# Patient Record
Sex: FEMALE | Race: BLACK OR AFRICAN AMERICAN | NOT HISPANIC OR LATINO | Employment: OTHER | URBAN - METROPOLITAN AREA
[De-identification: names, ages, dates, MRNs, and addresses within clinical notes are randomized per-mention and may not be internally consistent; named-entity substitution may affect disease eponyms.]

---

## 2020-03-01 ENCOUNTER — HOSPITAL ENCOUNTER (EMERGENCY)
Facility: HOSPITAL | Age: 65
Discharge: HOME | End: 2020-03-01
Attending: EMERGENCY MEDICINE
Payer: COMMERCIAL

## 2020-03-01 VITALS
HEIGHT: 62 IN | HEART RATE: 67 BPM | OXYGEN SATURATION: 94 % | TEMPERATURE: 98 F | BODY MASS INDEX: 28.52 KG/M2 | DIASTOLIC BLOOD PRESSURE: 79 MMHG | SYSTOLIC BLOOD PRESSURE: 163 MMHG | RESPIRATION RATE: 24 BRPM | WEIGHT: 155 LBS

## 2020-03-01 DIAGNOSIS — R07.9 CHEST PAIN: ICD-10-CM

## 2020-03-01 DIAGNOSIS — M54.9 BACK PAIN, UNSPECIFIED BACK LOCATION, UNSPECIFIED BACK PAIN LATERALITY, UNSPECIFIED CHRONICITY: Primary | ICD-10-CM

## 2020-03-01 DIAGNOSIS — I10 HYPERTENSION, UNSPECIFIED TYPE: ICD-10-CM

## 2020-03-01 LAB
ALBUMIN SERPL BCP-MCNC: 3.7 G/DL (ref 3.5–5.2)
ALP SERPL-CCNC: 94 U/L (ref 55–135)
ALT SERPL W/O P-5'-P-CCNC: 10 U/L (ref 10–44)
ANION GAP SERPL CALC-SCNC: 12 MMOL/L (ref 8–16)
AST SERPL-CCNC: 11 U/L (ref 10–40)
BASOPHILS # BLD AUTO: 0.03 K/UL (ref 0–0.2)
BASOPHILS NFR BLD: 0.4 % (ref 0–1.9)
BILIRUB SERPL-MCNC: 0.8 MG/DL (ref 0.1–1)
BNP SERPL-MCNC: 33 PG/ML (ref 0–99)
BUN SERPL-MCNC: 8 MG/DL (ref 8–23)
CALCIUM SERPL-MCNC: 10.4 MG/DL (ref 8.7–10.5)
CHLORIDE SERPL-SCNC: 103 MMOL/L (ref 95–110)
CO2 SERPL-SCNC: 23 MMOL/L (ref 23–29)
CREAT SERPL-MCNC: 0.7 MG/DL (ref 0.5–1.4)
DIFFERENTIAL METHOD: ABNORMAL
EOSINOPHIL # BLD AUTO: 0.1 K/UL (ref 0–0.5)
EOSINOPHIL NFR BLD: 0.8 % (ref 0–8)
ERYTHROCYTE [DISTWIDTH] IN BLOOD BY AUTOMATED COUNT: 14.4 % (ref 11.5–14.5)
EST. GFR  (AFRICAN AMERICAN): >60 ML/MIN/1.73 M^2
EST. GFR  (NON AFRICAN AMERICAN): >60 ML/MIN/1.73 M^2
GLUCOSE SERPL-MCNC: 101 MG/DL (ref 70–110)
HCT VFR BLD AUTO: 40.6 % (ref 37–48.5)
HGB BLD-MCNC: 12.9 G/DL (ref 12–16)
IMM GRANULOCYTES # BLD AUTO: 0.02 K/UL (ref 0–0.04)
IMM GRANULOCYTES NFR BLD AUTO: 0.3 % (ref 0–0.5)
LYMPHOCYTES # BLD AUTO: 1.9 K/UL (ref 1–4.8)
LYMPHOCYTES NFR BLD: 23.8 % (ref 18–48)
MCH RBC QN AUTO: 27 PG (ref 27–31)
MCHC RBC AUTO-ENTMCNC: 31.8 G/DL (ref 32–36)
MCV RBC AUTO: 85 FL (ref 82–98)
MONOCYTES # BLD AUTO: 0.6 K/UL (ref 0.3–1)
MONOCYTES NFR BLD: 7.4 % (ref 4–15)
NEUTROPHILS # BLD AUTO: 5.3 K/UL (ref 1.8–7.7)
NEUTROPHILS NFR BLD: 67.3 % (ref 38–73)
NRBC BLD-RTO: 0 /100 WBC
PLATELET # BLD AUTO: 236 K/UL (ref 150–350)
PMV BLD AUTO: 10.4 FL (ref 9.2–12.9)
POTASSIUM SERPL-SCNC: 3.9 MMOL/L (ref 3.5–5.1)
PROT SERPL-MCNC: 8.5 G/DL (ref 6–8.4)
RBC # BLD AUTO: 4.77 M/UL (ref 4–5.4)
SODIUM SERPL-SCNC: 138 MMOL/L (ref 136–145)
TROPONIN I SERPL DL<=0.01 NG/ML-MCNC: <0.006 NG/ML (ref 0–0.03)
TROPONIN I SERPL DL<=0.01 NG/ML-MCNC: <0.006 NG/ML (ref 0–0.03)
WBC # BLD AUTO: 7.82 K/UL (ref 3.9–12.7)

## 2020-03-01 PROCEDURE — 85025 COMPLETE CBC W/AUTO DIFF WBC: CPT

## 2020-03-01 PROCEDURE — 93010 EKG 12-LEAD: ICD-10-PCS | Mod: ,,, | Performed by: INTERNAL MEDICINE

## 2020-03-01 PROCEDURE — 99284 EMERGENCY DEPT VISIT MOD MDM: CPT | Mod: 25

## 2020-03-01 PROCEDURE — 63600175 PHARM REV CODE 636 W HCPCS: Performed by: EMERGENCY MEDICINE

## 2020-03-01 PROCEDURE — 96375 TX/PRO/DX INJ NEW DRUG ADDON: CPT

## 2020-03-01 PROCEDURE — 96374 THER/PROPH/DIAG INJ IV PUSH: CPT

## 2020-03-01 PROCEDURE — 93010 ELECTROCARDIOGRAM REPORT: CPT | Mod: ,,, | Performed by: INTERNAL MEDICINE

## 2020-03-01 PROCEDURE — 84484 ASSAY OF TROPONIN QUANT: CPT

## 2020-03-01 PROCEDURE — 25000003 PHARM REV CODE 250: Performed by: EMERGENCY MEDICINE

## 2020-03-01 PROCEDURE — 80053 COMPREHEN METABOLIC PANEL: CPT

## 2020-03-01 PROCEDURE — 93005 ELECTROCARDIOGRAM TRACING: CPT

## 2020-03-01 PROCEDURE — 83880 ASSAY OF NATRIURETIC PEPTIDE: CPT

## 2020-03-01 RX ORDER — HYDROMORPHONE HYDROCHLORIDE 2 MG/ML
0.5 INJECTION, SOLUTION INTRAMUSCULAR; INTRAVENOUS; SUBCUTANEOUS
Status: COMPLETED | OUTPATIENT
Start: 2020-03-01 | End: 2020-03-01

## 2020-03-01 RX ORDER — ASPIRIN 325 MG
325 TABLET ORAL
Status: COMPLETED | OUTPATIENT
Start: 2020-03-01 | End: 2020-03-01

## 2020-03-01 RX ORDER — LISINOPRIL 10 MG/1
10 TABLET ORAL DAILY
COMMUNITY
End: 2022-06-27 | Stop reason: DRUGHIGH

## 2020-03-01 RX ORDER — NAPROXEN 500 MG/1
500 TABLET ORAL 2 TIMES DAILY WITH MEALS
Qty: 20 TABLET | Refills: 0 | Status: SHIPPED | OUTPATIENT
Start: 2020-03-01 | End: 2020-03-01 | Stop reason: SDUPTHER

## 2020-03-01 RX ORDER — METHOCARBAMOL 500 MG/1
1000 TABLET, FILM COATED ORAL 3 TIMES DAILY
Qty: 30 TABLET | Refills: 0 | Status: SHIPPED | OUTPATIENT
Start: 2020-03-01 | End: 2020-03-06

## 2020-03-01 RX ORDER — LABETALOL HYDROCHLORIDE 5 MG/ML
10 INJECTION, SOLUTION INTRAVENOUS
Status: COMPLETED | OUTPATIENT
Start: 2020-03-01 | End: 2020-03-01

## 2020-03-01 RX ORDER — METHOCARBAMOL 500 MG/1
1000 TABLET, FILM COATED ORAL 3 TIMES DAILY
Qty: 30 TABLET | Refills: 0 | Status: SHIPPED | OUTPATIENT
Start: 2020-03-01 | End: 2020-03-01 | Stop reason: SDUPTHER

## 2020-03-01 RX ORDER — ATORVASTATIN CALCIUM 10 MG/1
10 TABLET, FILM COATED ORAL DAILY
COMMUNITY

## 2020-03-01 RX ORDER — NAPROXEN 500 MG/1
500 TABLET ORAL 2 TIMES DAILY WITH MEALS
Qty: 20 TABLET | Refills: 0 | Status: SHIPPED | OUTPATIENT
Start: 2020-03-01 | End: 2022-06-27

## 2020-03-01 RX ADMIN — ASPIRIN 325 MG ORAL TABLET 325 MG: 325 PILL ORAL at 10:03

## 2020-03-01 RX ADMIN — HYDROMORPHONE HYDROCHLORIDE 0.5 MG: 2 INJECTION, SOLUTION INTRAMUSCULAR; INTRAVENOUS; SUBCUTANEOUS at 10:03

## 2020-03-01 RX ADMIN — LABETALOL HYDROCHLORIDE 10 MG: 5 INJECTION INTRAVENOUS at 12:03

## 2020-03-01 NOTE — ED TRIAGE NOTES
Pt arrived to the ED via POV from home with c/o right shoulder pain. Reports shoulder painthat radiates to rt side of neck started on this past Friday. Reports some heavy lifting(boxes in the attic) and pain started shortly afterwards. Rates pain 9/10 on pain scale.

## 2020-03-01 NOTE — ED PROVIDER NOTES
Encounter Date: 3/1/2020    SCRIBE #1 NOTE: I, Courtney Mace, am scribing for, and in the presence of,  Viridiana Osei MD. I have scribed the following portions of the note - Other sections scribed: HPI/ROS/PE.       History     Chief Complaint   Patient presents with    Neck Pain     Neck spasms that began Friday night.  Denies known injury.  /93, 201/93 at triage.  Pt took BP meds at 7:30 am.    Hypertension     This 64 y.o. female with a medical history of HTN and high cholesterol presents to the ED for an emergent evaluation of neck pain. Pt reports R-sided neck pain that radiates to the R, upper back and then to the R, upper chest x3 days. Pt reports she was moving objects around 3 days ago and felt slight pain that evening after getting out of the shower. Pt notes increasing pain that night into the following AM. Pt states pain feels like a sharp muscle spasm. Pain is exacerbated with lateral rotation of the neck. Pt attempted tx with OTC Ibuprofen with last dose being last night as well as an IcyHot patch. Pt reports she has had the same feeling of pain to the lumbar back previously. Pt states she was prescribed an unknown medication for muscle spasm from PCP, Dr. Newberry, for previous lumbar back pain. No recent direct traumas, falls, or injuries. No hx of any cardiac disease reported. PSHx includes a hysterectomy or . No known allergies to medications. Pt states she took antihypertensive medications around 07:30 this AM. Of note, pt drinks 2 beers/day. Pt smokes cigarettes. No IV drug use. She denies a hx of DM. Otherwise, no fever, chills, numbness, weakness, SOB, n/v, diaphoresis, light-headedness, visual disturbances, and any other associated symptoms.    The history is provided by the patient. No  was used.     Review of patient's allergies indicates:  No Known Allergies  Past Medical History:   Diagnosis Date    High cholesterol     Hypertension      Past  Surgical History:   Procedure Laterality Date    HYSTERECTOMY       History reviewed. No pertinent family history.  Social History     Tobacco Use    Smoking status: Current Every Day Smoker     Packs/day: 0.50     Years: 20.00     Pack years: 10.00     Types: Cigarettes    Smokeless tobacco: Never Used   Substance Use Topics    Alcohol use: Yes     Comment: occaisionally    Drug use: Never     Review of Systems   Constitutional: Negative for chills, diaphoresis and fever.   HENT: Negative for congestion, rhinorrhea and sore throat.    Eyes: Negative for photophobia and visual disturbance.   Respiratory: Negative for cough and shortness of breath.    Cardiovascular: Positive for chest pain. Negative for leg swelling.   Gastrointestinal: Negative for abdominal pain, blood in stool, constipation, diarrhea, nausea and vomiting.   Genitourinary: Negative for dysuria, flank pain, frequency, hematuria and urgency.   Musculoskeletal: Positive for back pain and neck pain. Negative for neck stiffness.   Skin: Negative for rash and wound.   Neurological: Negative for weakness, light-headedness, numbness and headaches.   Psychiatric/Behavioral: Negative for confusion and suicidal ideas.   All other systems reviewed and are negative.      Physical Exam     Initial Vitals [03/01/20 0958]   BP Pulse Resp Temp SpO2   (!) 199/93 82 18 98.7 °F (37.1 °C) 97 %      MAP       --         Physical Exam    Nursing note and vitals reviewed.  Constitutional: She appears well-developed and well-nourished. She is not diaphoretic. No distress.   HENT:   Head: Normocephalic and atraumatic.   Mouth/Throat: Oropharynx is clear and moist. No oropharyngeal exudate.   Eyes: Conjunctivae and EOM are normal. Pupils are equal, round, and reactive to light. Right eye exhibits no discharge. Left eye exhibits no discharge.   Neck: Normal range of motion. Neck supple. No JVD present.   Cardiovascular: Normal rate, regular rhythm, normal heart sounds  and intact distal pulses. Exam reveals no gallop and no friction rub.    No murmur heard.  Pulmonary/Chest: Breath sounds normal. No respiratory distress. She has no wheezes. She has no rhonchi. She has no rales. She exhibits tenderness (R-sided TTP).   Abdominal: Soft. Bowel sounds are normal. She exhibits no distension. There is no tenderness. There is no rebound and no guarding.   Musculoskeletal: Normal range of motion. She exhibits no edema or tenderness.   R paraspinal TTP. No midline TTP.    Lymphadenopathy:     She has no cervical adenopathy.   Neurological: She is alert and oriented to person, place, and time. No cranial nerve deficit.   Moves all extremities and carries on conversation. CN- II: PERRL; III/IV/VI: EOMI w/out evidence of nystagmus; V: no deficits appreciated to light touch bilateral face; VII: no facial weakness, no facial asymmetry. Eyebrow raise symmetric. Smile symmetric; IX/X: palate midline, and raises symmetrically; XI: shoulder shrug 5/5 bilaterally; XII: tongue is midline w/out asymmetry. Strength 5/5 to bilateral upper and lower extremities, sensation intact to light touch   Skin: Skin is warm and dry.   Psychiatric: She has a normal mood and affect. Thought content normal.         ED Course   Procedures  Labs Reviewed   CBC W/ AUTO DIFFERENTIAL - Abnormal; Notable for the following components:       Result Value    Mean Corpuscular Hemoglobin Conc 31.8 (*)     All other components within normal limits   COMPREHENSIVE METABOLIC PANEL - Abnormal; Notable for the following components:    Total Protein 8.5 (*)     All other components within normal limits   TROPONIN I   TROPONIN I   B-TYPE NATRIURETIC PEPTIDE     EKG Readings: (Independently Interpreted)   Normal sinus rhythm at 75 with no ST elevation or depression.  T-wave flattening in aVL and V1.  Normal axis.  Normal intervals.     ECG Results          EKG 12-lead (In process)  Result time 03/01/20 13:25:22    In process by  Interface, Lab [facility] (03/01/20 13:25:22)                 Narrative:    Test Reason : R07.9,    Vent. Rate : 075 BPM     Atrial Rate : 075 BPM     P-R Int : 188 ms          QRS Dur : 092 ms      QT Int : 406 ms       P-R-T Axes : 079 030 063 degrees     QTc Int : 453 ms    Normal sinus rhythm  Normal ECG  No previous ECGs available    Referred By: [email or web address]   SELF           Confirmed By:                   In process by Interface, Lab [facility] (03/01/20 13:21:07)                 Narrative:    Test Reason : R07.9,    Vent. Rate : 075 BPM     Atrial Rate : 075 BPM     P-R Int : 188 ms          QRS Dur : 092 ms      QT Int : 406 ms       P-R-T Axes : 079 030 063 degrees     QTc Int : 453 ms    Normal sinus rhythm  Normal ECG  No previous ECGs available    Referred By: [email or web address]   SELF           Confirmed By:                             Imaging Results          X-Ray Chest PA And Lateral (In process)             MDM  64 year old patient with hx of HTN, HLD presenting secondary to chest pain, thoracic neck/back pain. Patient is at lower risk of ACS due to risk factors and HPI with a heart score of 2. Patient has received an aspirin. EKG was reassuring and chest xray showed nothing acute. Most likely musculoskeletal cause of patient.       Also considered but less likely:     PE: normal rate, no sob/recent immobilization/surgery/travel/family history. PERC negative  Pneumonia: chest xray negative. No fever. No cough and lungs non consistent with pna  Tamponade: unlikely due to chest xray and ekg  STEMI: No STEMI on ekg  Dissection: equal pulses bilaterally and no ripping chest pain to the back  Esophageal rupture: no dysphagia or vomiting and chest xray negative for mediastinal air  Arrhythmia: no arrhythmia on ekg  CHF: no fluid overload on Cxr and physical exam  Pneumothorax: bilateral breath sounds and no signs of pneumothorax on chest xray    Patient felt improved with interventions and has a lower risk of  impending cardiac failure to the heart score of 2. Patient was discharged with follow up with PCP.  Patient paraspinal back pain improved after treatment. Blood pressure improved after treatment. delta trop negative.  Return precautions given, patient understands and agrees with plan. All questions answered.  Instructed to follow up with PCP.         Additional MDM:   Heart Score:    History:          Slightly suspicious.  ECG:             Normal  Age:               45-65 years  Risk factors: 1-2 risk factors  Troponin:       Less than or equal to normal limit  Final Score: 2             Scribe Attestation:   Scribe #1: I performed the above scribed service and the documentation accurately describes the services I performed. I attest to the accuracy of the note.                          Clinical Impression:       ICD-10-CM ICD-9-CM   1. Back pain, unspecified back location, unspecified back pain laterality, unspecified chronicity M54.9 724.5   2. Chest pain R07.9 786.50   3. Hypertension, unspecified type I10 401.9           Scribe Attestation: I, Viridiana Osei, personally performed the services described in this documentation. All medical record entries made by the scribe were at my direction and in my presence. I have reviewed the chart and agree that the record reflects my personal performance and is accurate and complete.   ED Disposition Condition    Discharge Stable        ED Prescriptions     Medication Sig Dispense Start Date End Date Auth. Provider    methocarbamol (ROBAXIN) 500 MG Tab  (Status: Discontinued) Take 2 tablets (1,000 mg total) by mouth 3 (three) times daily. As needed for muscle pain for 5 days 30 tablet 3/1/2020 3/1/2020 Viridiana Osei MD    naproxen (NAPROSYN) 500 MG tablet  (Status: Discontinued) Take 1 tablet (500 mg total) by mouth 2 (two) times daily with meals. As needed for pain 20 tablet 3/1/2020 3/1/2020 Viridiana Osei MD    methocarbamol (ROBAXIN) 500 MG Tab Take 2 tablets (1,000 mg  total) by mouth 3 (three) times daily. As needed for muscle pain for 5 days 30 tablet 3/1/2020 3/6/2020 Viridiana Osei MD    naproxen (NAPROSYN) 500 MG tablet Take 1 tablet (500 mg total) by mouth 2 (two) times daily with meals. As needed for pain 20 tablet 3/1/2020  Viridiana Osei MD        Follow-up Information     Follow up With Specialties Details Why Contact Info    [facility]  Schedule an appointment as soon as possible for a visit in 2 days to discuss recent ED visit, to establish primary doctor [address] [place] [address]  [phone]      [facility]  As needed, If symptoms worsen [address] [facility] [address] [phone]                                     Viridiana Osei MD  03/01/20 5044

## 2020-03-01 NOTE — DISCHARGE INSTRUCTIONS
Please return to the ED immediately for any chest pain, shortness of breath, numbness, weakness, vision changes or any other concerns.  Please take your blood pressure for the next 2-3 days and bring these numbers with you to your primary care doctor.  As you may need adjustments of your medications.      Thank you for coming to our Emergency Department today. It is important to remember that some problems are difficult to diagnose and may not be found during your first visit. Be sure to follow up with your primary care doctor and review any labs/imaging that was performed with them. If you do not have a primary care doctor, you may contact the one listed on your discharge paperwork or you may also call the [facility] Appointment Desk at [phone] to schedule an appointment with one.     All medications may potentially have side effects and it is impossible to predict which medications may give you side effects. If you feel that you are having a negative effect of any medication you should immediately stop taking them and seek medical attention.    Return to the ER with any questions/concerns, new/concerning symptoms, worsening or failure to improve. Do not drive or make any important decisions for 24 hours if you have received any pain medications, sedatives or mood altering drugs during your ER visit.

## 2022-06-26 PROCEDURE — 99291 CRITICAL CARE FIRST HOUR: CPT | Mod: 25

## 2022-06-27 ENCOUNTER — HOSPITAL ENCOUNTER (OUTPATIENT)
Facility: HOSPITAL | Age: 67
Discharge: HOME | End: 2022-06-28
Attending: EMERGENCY MEDICINE | Admitting: EMERGENCY MEDICINE
Payer: COMMERCIAL

## 2022-06-27 ENCOUNTER — ANESTHESIA EVENT (OUTPATIENT)
Dept: ENDOSCOPY | Facility: HOSPITAL | Age: 67
End: 2022-06-27
Payer: COMMERCIAL

## 2022-06-27 ENCOUNTER — ANESTHESIA (OUTPATIENT)
Dept: ENDOSCOPY | Facility: HOSPITAL | Age: 67
End: 2022-06-27
Payer: COMMERCIAL

## 2022-06-27 DIAGNOSIS — K92.2 UGIB (UPPER GASTROINTESTINAL BLEED): ICD-10-CM

## 2022-06-27 DIAGNOSIS — R55 SYNCOPE: ICD-10-CM

## 2022-06-27 DIAGNOSIS — K92.2 ACUTE GI BLEEDING: Primary | ICD-10-CM

## 2022-06-27 DIAGNOSIS — K92.1 MELENA: ICD-10-CM

## 2022-06-27 DIAGNOSIS — F10.920 ALCOHOLIC INTOXICATION WITHOUT COMPLICATION: ICD-10-CM

## 2022-06-27 PROBLEM — M54.50 LUMBAGO: Status: ACTIVE | Noted: 2022-06-27

## 2022-06-27 PROBLEM — E78.5 HLD (HYPERLIPIDEMIA): Status: ACTIVE | Noted: 2022-06-27

## 2022-06-27 PROBLEM — I10 ESSENTIAL HYPERTENSION: Status: ACTIVE | Noted: 2022-06-27

## 2022-06-27 PROBLEM — Z72.0 TOBACCO ABUSE: Status: ACTIVE | Noted: 2022-06-27

## 2022-06-27 LAB
ABO + RH BLD: NORMAL
ALBUMIN SERPL BCP-MCNC: 3.3 G/DL (ref 3.5–5.2)
ALP SERPL-CCNC: 78 U/L (ref 55–135)
ALT SERPL W/O P-5'-P-CCNC: 14 U/L (ref 10–44)
AMPHET+METHAMPHET UR QL: NEGATIVE
ANION GAP SERPL CALC-SCNC: 11 MMOL/L (ref 8–16)
APTT BLDCRRT: 22.2 SEC (ref 21–32)
AST SERPL-CCNC: 15 U/L (ref 10–40)
AV INDEX (PROSTH): 0.76
AV MEAN GRADIENT: 6 MMHG
AV PEAK GRADIENT: 16 MMHG
AV VALVE AREA: 2.37 CM2
AV VELOCITY RATIO: 0.67
BACTERIA #/AREA URNS HPF: NORMAL /HPF
BARBITURATES UR QL SCN>200 NG/ML: NEGATIVE
BASOPHILS # BLD AUTO: 0.02 K/UL (ref 0–0.2)
BASOPHILS # BLD AUTO: 0.03 K/UL (ref 0–0.2)
BASOPHILS # BLD AUTO: 0.03 K/UL (ref 0–0.2)
BASOPHILS NFR BLD: 0.4 % (ref 0–1.9)
BASOPHILS NFR BLD: 0.4 % (ref 0–1.9)
BASOPHILS NFR BLD: 0.5 % (ref 0–1.9)
BASOPHILS NFR BLD: 0.5 % (ref 0–1.9)
BASOPHILS NFR BLD: 0.6 % (ref 0–1.9)
BENZODIAZ UR QL SCN>200 NG/ML: NEGATIVE
BILIRUB SERPL-MCNC: 0.3 MG/DL (ref 0.1–1)
BILIRUB UR QL STRIP: NEGATIVE
BLD GP AB SCN CELLS X3 SERPL QL: NORMAL
BUN SERPL-MCNC: 11 MG/DL (ref 8–23)
BZE UR QL SCN: NEGATIVE
CALCIUM SERPL-MCNC: 8.6 MG/DL (ref 8.7–10.5)
CANNABINOIDS UR QL SCN: NEGATIVE
CHLORIDE SERPL-SCNC: 108 MMOL/L (ref 95–110)
CLARITY UR: CLEAR
CO2 SERPL-SCNC: 18 MMOL/L (ref 23–29)
COLOR UR: COLORLESS
CREAT SERPL-MCNC: 0.7 MG/DL (ref 0.5–1.4)
CREAT UR-MCNC: 12.7 MG/DL (ref 15–325)
CTP QC/QA: YES
CV ECHO LV RWT: 0.41 CM
DIFFERENTIAL METHOD: ABNORMAL
DOP CALC AO PEAK VEL: 1.97 M/S
DOP CALC AO VTI: 37.02 CM
DOP CALC LVOT AREA: 3.1 CM2
DOP CALC LVOT DIAMETER: 1.99 CM
DOP CALC LVOT PEAK VEL: 1.32 M/S
DOP CALC LVOT STROKE VOLUME: 87.82 CM3
DOP CALCLVOT PEAK VEL VTI: 28.25 CM
E WAVE DECELERATION TIME: 258.61 MSEC
E/A RATIO: 0.96
E/E' RATIO: 13.82 M/S
ECHO LV POSTERIOR WALL: 0.96 CM (ref 0.6–1.1)
EJECTION FRACTION: 65 %
EOSINOPHIL # BLD AUTO: 0.1 K/UL (ref 0–0.5)
EOSINOPHIL # BLD AUTO: 0.2 K/UL (ref 0–0.5)
EOSINOPHIL NFR BLD: 1.5 % (ref 0–8)
EOSINOPHIL NFR BLD: 1.7 % (ref 0–8)
EOSINOPHIL NFR BLD: 2.1 % (ref 0–8)
EOSINOPHIL NFR BLD: 2.1 % (ref 0–8)
EOSINOPHIL NFR BLD: 2.6 % (ref 0–8)
ERYTHROCYTE [DISTWIDTH] IN BLOOD BY AUTOMATED COUNT: 13.5 % (ref 11.5–14.5)
ERYTHROCYTE [DISTWIDTH] IN BLOOD BY AUTOMATED COUNT: 13.6 % (ref 11.5–14.5)
ERYTHROCYTE [DISTWIDTH] IN BLOOD BY AUTOMATED COUNT: 13.6 % (ref 11.5–14.5)
ERYTHROCYTE [DISTWIDTH] IN BLOOD BY AUTOMATED COUNT: 13.7 % (ref 11.5–14.5)
ERYTHROCYTE [DISTWIDTH] IN BLOOD BY AUTOMATED COUNT: 13.7 % (ref 11.5–14.5)
EST. GFR  (AFRICAN AMERICAN): >60 ML/MIN/1.73 M^2
EST. GFR  (NON AFRICAN AMERICAN): >60 ML/MIN/1.73 M^2
ETHANOL SERPL-MCNC: 129 MG/DL
FERRITIN SERPL-MCNC: 188 NG/ML (ref 20–300)
FOLATE SERPL-MCNC: 10.6 NG/ML (ref 4–24)
FRACTIONAL SHORTENING: 43 % (ref 28–44)
GLUCOSE SERPL-MCNC: 110 MG/DL (ref 70–110)
GLUCOSE UR QL STRIP: NEGATIVE
HCT VFR BLD AUTO: 29.2 % (ref 37–48.5)
HCT VFR BLD AUTO: 29.9 % (ref 37–48.5)
HCT VFR BLD AUTO: 30.5 % (ref 37–48.5)
HCT VFR BLD AUTO: 31.3 % (ref 37–48.5)
HCT VFR BLD AUTO: 35.3 % (ref 37–48.5)
HGB BLD-MCNC: 10.1 G/DL (ref 12–16)
HGB BLD-MCNC: 11.3 G/DL (ref 12–16)
HGB BLD-MCNC: 9.5 G/DL (ref 12–16)
HGB BLD-MCNC: 9.7 G/DL (ref 12–16)
HGB BLD-MCNC: 9.9 G/DL (ref 12–16)
HGB UR QL STRIP: NEGATIVE
IMM GRANULOCYTES # BLD AUTO: 0.01 K/UL (ref 0–0.04)
IMM GRANULOCYTES # BLD AUTO: 0.02 K/UL (ref 0–0.04)
IMM GRANULOCYTES NFR BLD AUTO: 0.1 % (ref 0–0.5)
IMM GRANULOCYTES NFR BLD AUTO: 0.2 % (ref 0–0.5)
IMM GRANULOCYTES NFR BLD AUTO: 0.5 % (ref 0–0.5)
INR PPP: 1 (ref 0.8–1.2)
INTERVENTRICULAR SEPTUM: 1.27 CM (ref 0.6–1.1)
IRON SERPL-MCNC: 62 UG/DL (ref 30–160)
IVRT: 124.57 MSEC
KETONES UR QL STRIP: NEGATIVE
LA MAJOR: 6.26 CM
LA MINOR: 6.12 CM
LA WIDTH: 5.02 CM
LEFT ATRIUM SIZE: 5.05 CM
LEFT ATRIUM VOLUME: 133.37 CM3
LEFT INTERNAL DIMENSION IN SYSTOLE: 2.67 CM (ref 2.1–4)
LEFT VENTRICLE DIASTOLIC VOLUME: 101.35 ML
LEFT VENTRICLE SYSTOLIC VOLUME: 26.34 ML
LEFT VENTRICULAR INTERNAL DIMENSION IN DIASTOLE: 4.68 CM (ref 3.5–6)
LEFT VENTRICULAR MASS: 189.83 G
LEUKOCYTE ESTERASE UR QL STRIP: ABNORMAL
LV LATERAL E/E' RATIO: 12.67 M/S
LV SEPTAL E/E' RATIO: 15.2 M/S
LYMPHOCYTES # BLD AUTO: 1.3 K/UL (ref 1–4.8)
LYMPHOCYTES # BLD AUTO: 1.7 K/UL (ref 1–4.8)
LYMPHOCYTES # BLD AUTO: 1.9 K/UL (ref 1–4.8)
LYMPHOCYTES # BLD AUTO: 2 K/UL (ref 1–4.8)
LYMPHOCYTES # BLD AUTO: 3.5 K/UL (ref 1–4.8)
LYMPHOCYTES NFR BLD: 32.7 % (ref 18–48)
LYMPHOCYTES NFR BLD: 35.1 % (ref 18–48)
LYMPHOCYTES NFR BLD: 41.7 % (ref 18–48)
LYMPHOCYTES NFR BLD: 44.5 % (ref 18–48)
LYMPHOCYTES NFR BLD: 48 % (ref 18–48)
MAGNESIUM SERPL-MCNC: 1.6 MG/DL (ref 1.6–2.6)
MCH RBC QN AUTO: 27.5 PG (ref 27–31)
MCH RBC QN AUTO: 27.7 PG (ref 27–31)
MCH RBC QN AUTO: 27.7 PG (ref 27–31)
MCH RBC QN AUTO: 28 PG (ref 27–31)
MCH RBC QN AUTO: 28 PG (ref 27–31)
MCHC RBC AUTO-ENTMCNC: 31.8 G/DL (ref 32–36)
MCHC RBC AUTO-ENTMCNC: 32 G/DL (ref 32–36)
MCHC RBC AUTO-ENTMCNC: 32.3 G/DL (ref 32–36)
MCHC RBC AUTO-ENTMCNC: 32.5 G/DL (ref 32–36)
MCHC RBC AUTO-ENTMCNC: 33.2 G/DL (ref 32–36)
MCV RBC AUTO: 84 FL (ref 82–98)
MCV RBC AUTO: 86 FL (ref 82–98)
MCV RBC AUTO: 87 FL (ref 82–98)
METHADONE UR QL SCN>300 NG/ML: NEGATIVE
MICROSCOPIC COMMENT: NORMAL
MONOCYTES # BLD AUTO: 0.3 K/UL (ref 0.3–1)
MONOCYTES # BLD AUTO: 0.3 K/UL (ref 0.3–1)
MONOCYTES # BLD AUTO: 0.4 K/UL (ref 0.3–1)
MONOCYTES # BLD AUTO: 0.5 K/UL (ref 0.3–1)
MONOCYTES # BLD AUTO: 0.5 K/UL (ref 0.3–1)
MONOCYTES NFR BLD: 10.3 % (ref 4–15)
MONOCYTES NFR BLD: 6.7 % (ref 4–15)
MONOCYTES NFR BLD: 7.3 % (ref 4–15)
MONOCYTES NFR BLD: 7.7 % (ref 4–15)
MONOCYTES NFR BLD: 8.5 % (ref 4–15)
MV PEAK A VEL: 0.79 M/S
MV PEAK E VEL: 0.76 M/S
NEUTROPHILS # BLD AUTO: 1.9 K/UL (ref 1.8–7.7)
NEUTROPHILS # BLD AUTO: 2.3 K/UL (ref 1.8–7.7)
NEUTROPHILS # BLD AUTO: 2.4 K/UL (ref 1.8–7.7)
NEUTROPHILS # BLD AUTO: 2.5 K/UL (ref 1.8–7.7)
NEUTROPHILS # BLD AUTO: 3.1 K/UL (ref 1.8–7.7)
NEUTROPHILS NFR BLD: 42.7 % (ref 38–73)
NEUTROPHILS NFR BLD: 44.2 % (ref 38–73)
NEUTROPHILS NFR BLD: 47.7 % (ref 38–73)
NEUTROPHILS NFR BLD: 51.7 % (ref 38–73)
NEUTROPHILS NFR BLD: 57.6 % (ref 38–73)
NITRITE UR QL STRIP: NEGATIVE
NRBC BLD-RTO: 0 /100 WBC
OPIATES UR QL SCN: NEGATIVE
PCP UR QL SCN>25 NG/ML: NEGATIVE
PH UR STRIP: 5 [PH] (ref 5–8)
PISA TR MAX VEL: 2.49 M/S
PLATELET # BLD AUTO: 167 K/UL (ref 150–450)
PLATELET # BLD AUTO: 168 K/UL (ref 150–450)
PLATELET # BLD AUTO: 170 K/UL (ref 150–450)
PLATELET # BLD AUTO: 173 K/UL (ref 150–450)
PLATELET # BLD AUTO: 235 K/UL (ref 150–450)
PMV BLD AUTO: 10.7 FL (ref 9.2–12.9)
PMV BLD AUTO: 10.9 FL (ref 9.2–12.9)
PMV BLD AUTO: 11 FL (ref 9.2–12.9)
PMV BLD AUTO: 11 FL (ref 9.2–12.9)
PMV BLD AUTO: 11.1 FL (ref 9.2–12.9)
POCT GLUCOSE: 120 MG/DL (ref 70–110)
POTASSIUM SERPL-SCNC: 3.6 MMOL/L (ref 3.5–5.1)
PROT SERPL-MCNC: 7.1 G/DL (ref 6–8.4)
PROT UR QL STRIP: NEGATIVE
PROTHROMBIN TIME: 10.3 SEC (ref 9–12.5)
PV PEAK VELOCITY: 1.26 CM/S
RA MAJOR: 5.57 CM
RA PRESSURE: 3 MMHG
RA WIDTH: 5.13 CM
RBC # BLD AUTO: 3.46 M/UL (ref 4–5.4)
RBC # BLD AUTO: 3.47 M/UL (ref 4–5.4)
RBC # BLD AUTO: 3.53 M/UL (ref 4–5.4)
RBC # BLD AUTO: 3.65 M/UL (ref 4–5.4)
RBC # BLD AUTO: 4.08 M/UL (ref 4–5.4)
RIGHT VENTRICULAR END-DIASTOLIC DIMENSION: 4.23 CM
SARS-COV-2 RDRP RESP QL NAA+PROBE: NEGATIVE
SATURATED IRON: 17 % (ref 20–50)
SINUS: 3.07 CM
SODIUM SERPL-SCNC: 137 MMOL/L (ref 136–145)
SP GR UR STRIP: <1.005 (ref 1–1.03)
STJ: 1.97 CM
TDI LATERAL: 0.06 M/S
TDI SEPTAL: 0.05 M/S
TDI: 0.06 M/S
TOTAL IRON BINDING CAPACITY: 357 UG/DL (ref 250–450)
TOXICOLOGY INFORMATION: ABNORMAL
TR MAX PG: 25 MMHG
TRANSFERRIN SERPL-MCNC: 241 MG/DL (ref 200–375)
TRICUSPID ANNULAR PLANE SYSTOLIC EXCURSION: 2.69 CM
TROPONIN I SERPL DL<=0.01 NG/ML-MCNC: 0.01 NG/ML (ref 0–0.03)
TSH SERPL DL<=0.005 MIU/L-ACNC: 1.96 UIU/ML (ref 0.4–4)
TV REST PULMONARY ARTERY PRESSURE: 28 MMHG
URN SPEC COLLECT METH UR: ABNORMAL
UROBILINOGEN UR STRIP-ACNC: NEGATIVE EU/DL
VIT B12 SERPL-MCNC: 787 PG/ML (ref 210–950)
WBC # BLD AUTO: 4.1 K/UL (ref 3.9–12.7)
WBC # BLD AUTO: 4.31 K/UL (ref 3.9–12.7)
WBC # BLD AUTO: 4.72 K/UL (ref 3.9–12.7)
WBC # BLD AUTO: 4.85 K/UL (ref 3.9–12.7)
WBC # BLD AUTO: 7.27 K/UL (ref 3.9–12.7)
WBC #/AREA URNS HPF: 0 /HPF (ref 0–5)

## 2022-06-27 PROCEDURE — 82962 GLUCOSE BLOOD TEST: CPT

## 2022-06-27 PROCEDURE — 86901 BLOOD TYPING SEROLOGIC RH(D): CPT | Performed by: EMERGENCY MEDICINE

## 2022-06-27 PROCEDURE — 82728 ASSAY OF FERRITIN: CPT | Performed by: HOSPITALIST

## 2022-06-27 PROCEDURE — 37000008 HC ANESTHESIA 1ST 15 MINUTES: Performed by: INTERNAL MEDICINE

## 2022-06-27 PROCEDURE — 81000 URINALYSIS NONAUTO W/SCOPE: CPT | Mod: 59 | Performed by: EMERGENCY MEDICINE

## 2022-06-27 PROCEDURE — 84484 ASSAY OF TROPONIN QUANT: CPT | Performed by: EMERGENCY MEDICINE

## 2022-06-27 PROCEDURE — 63600175 PHARM REV CODE 636 W HCPCS: Performed by: HOSPITALIST

## 2022-06-27 PROCEDURE — G0378 HOSPITAL OBSERVATION PER HR: HCPCS

## 2022-06-27 PROCEDURE — 63600175 PHARM REV CODE 636 W HCPCS: Performed by: NURSE ANESTHETIST, CERTIFIED REGISTERED

## 2022-06-27 PROCEDURE — 63600175 PHARM REV CODE 636 W HCPCS: Performed by: NURSE PRACTITIONER

## 2022-06-27 PROCEDURE — 96366 THER/PROPH/DIAG IV INF ADDON: CPT | Performed by: EMERGENCY MEDICINE

## 2022-06-27 PROCEDURE — 84443 ASSAY THYROID STIM HORMONE: CPT | Performed by: EMERGENCY MEDICINE

## 2022-06-27 PROCEDURE — 43235 PR EGD, FLEX, DIAGNOSTIC: ICD-10-PCS | Mod: ,,, | Performed by: INTERNAL MEDICINE

## 2022-06-27 PROCEDURE — 43235 EGD DIAGNOSTIC BRUSH WASH: CPT | Performed by: INTERNAL MEDICINE

## 2022-06-27 PROCEDURE — 85730 THROMBOPLASTIN TIME PARTIAL: CPT | Performed by: PHYSICIAN ASSISTANT

## 2022-06-27 PROCEDURE — 93010 ELECTROCARDIOGRAM REPORT: CPT | Mod: ,,, | Performed by: INTERNAL MEDICINE

## 2022-06-27 PROCEDURE — C9113 INJ PANTOPRAZOLE SODIUM, VIA: HCPCS | Performed by: NURSE PRACTITIONER

## 2022-06-27 PROCEDURE — 83735 ASSAY OF MAGNESIUM: CPT | Performed by: EMERGENCY MEDICINE

## 2022-06-27 PROCEDURE — 99285 EMERGENCY DEPT VISIT HI MDM: CPT | Mod: 25,,, | Performed by: NURSE PRACTITIONER

## 2022-06-27 PROCEDURE — 96366 THER/PROPH/DIAG IV INF ADDON: CPT | Mod: 59

## 2022-06-27 PROCEDURE — D9220A PRA ANESTHESIA: ICD-10-PCS | Mod: ANES,,, | Performed by: ANESTHESIOLOGY

## 2022-06-27 PROCEDURE — 25000003 PHARM REV CODE 250: Performed by: INTERNAL MEDICINE

## 2022-06-27 PROCEDURE — 25000003 PHARM REV CODE 250: Performed by: HOSPITALIST

## 2022-06-27 PROCEDURE — 99285 PR EMERGENCY DEPT VISIT,LEVEL V: ICD-10-PCS | Mod: 25,,, | Performed by: NURSE PRACTITIONER

## 2022-06-27 PROCEDURE — D9220A PRA ANESTHESIA: Mod: ANES,,, | Performed by: ANESTHESIOLOGY

## 2022-06-27 PROCEDURE — D9220A PRA ANESTHESIA: Mod: CRNA,,, | Performed by: NURSE ANESTHETIST, CERTIFIED REGISTERED

## 2022-06-27 PROCEDURE — 96375 TX/PRO/DX INJ NEW DRUG ADDON: CPT | Mod: 59

## 2022-06-27 PROCEDURE — 96361 HYDRATE IV INFUSION ADD-ON: CPT

## 2022-06-27 PROCEDURE — 63600175 PHARM REV CODE 636 W HCPCS: Performed by: PHYSICIAN ASSISTANT

## 2022-06-27 PROCEDURE — 25500020 PHARM REV CODE 255: Performed by: EMERGENCY MEDICINE

## 2022-06-27 PROCEDURE — 37000009 HC ANESTHESIA EA ADD 15 MINS: Performed by: INTERNAL MEDICINE

## 2022-06-27 PROCEDURE — 43235 EGD DIAGNOSTIC BRUSH WASH: CPT | Mod: ,,, | Performed by: INTERNAL MEDICINE

## 2022-06-27 PROCEDURE — 36415 COLL VENOUS BLD VENIPUNCTURE: CPT | Performed by: PHYSICIAN ASSISTANT

## 2022-06-27 PROCEDURE — U0002 COVID-19 LAB TEST NON-CDC: HCPCS | Performed by: PHYSICIAN ASSISTANT

## 2022-06-27 PROCEDURE — 84466 ASSAY OF TRANSFERRIN: CPT | Performed by: HOSPITALIST

## 2022-06-27 PROCEDURE — 85610 PROTHROMBIN TIME: CPT | Performed by: PHYSICIAN ASSISTANT

## 2022-06-27 PROCEDURE — D9220A PRA ANESTHESIA: ICD-10-PCS | Mod: CRNA,,, | Performed by: NURSE ANESTHETIST, CERTIFIED REGISTERED

## 2022-06-27 PROCEDURE — 80053 COMPREHEN METABOLIC PANEL: CPT | Performed by: EMERGENCY MEDICINE

## 2022-06-27 PROCEDURE — 93010 EKG 12-LEAD: ICD-10-PCS | Mod: ,,, | Performed by: INTERNAL MEDICINE

## 2022-06-27 PROCEDURE — 80307 DRUG TEST PRSMV CHEM ANLYZR: CPT | Performed by: PHYSICIAN ASSISTANT

## 2022-06-27 PROCEDURE — 82607 VITAMIN B-12: CPT | Performed by: HOSPITALIST

## 2022-06-27 PROCEDURE — 96365 THER/PROPH/DIAG IV INF INIT: CPT

## 2022-06-27 PROCEDURE — 25000003 PHARM REV CODE 250: Performed by: NURSE PRACTITIONER

## 2022-06-27 PROCEDURE — 25000003 PHARM REV CODE 250: Performed by: PHYSICIAN ASSISTANT

## 2022-06-27 PROCEDURE — 82746 ASSAY OF FOLIC ACID SERUM: CPT | Performed by: HOSPITALIST

## 2022-06-27 PROCEDURE — 25000003 PHARM REV CODE 250: Performed by: NURSE ANESTHETIST, CERTIFIED REGISTERED

## 2022-06-27 PROCEDURE — 85025 COMPLETE CBC W/AUTO DIFF WBC: CPT | Mod: 91 | Performed by: PHYSICIAN ASSISTANT

## 2022-06-27 PROCEDURE — 96376 TX/PRO/DX INJ SAME DRUG ADON: CPT

## 2022-06-27 PROCEDURE — 93005 ELECTROCARDIOGRAM TRACING: CPT

## 2022-06-27 PROCEDURE — C9113 INJ PANTOPRAZOLE SODIUM, VIA: HCPCS | Performed by: PHYSICIAN ASSISTANT

## 2022-06-27 PROCEDURE — 82077 ASSAY SPEC XCP UR&BREATH IA: CPT | Performed by: EMERGENCY MEDICINE

## 2022-06-27 PROCEDURE — S4991 NICOTINE PATCH NONLEGEND: HCPCS | Performed by: NURSE PRACTITIONER

## 2022-06-27 RX ORDER — PROPOFOL 10 MG/ML
VIAL (ML) INTRAVENOUS
Status: DISCONTINUED | OUTPATIENT
Start: 2022-06-27 | End: 2022-06-27

## 2022-06-27 RX ORDER — TALC
6 POWDER (GRAM) TOPICAL NIGHTLY PRN
Status: DISCONTINUED | OUTPATIENT
Start: 2022-06-27 | End: 2022-06-28 | Stop reason: HOSPADM

## 2022-06-27 RX ORDER — ONDANSETRON 2 MG/ML
4 INJECTION INTRAMUSCULAR; INTRAVENOUS EVERY 4 HOURS PRN
Status: DISCONTINUED | OUTPATIENT
Start: 2022-06-27 | End: 2022-06-28 | Stop reason: HOSPADM

## 2022-06-27 RX ORDER — LISINOPRIL 20 MG/1
20 TABLET ORAL DAILY
Status: ON HOLD | COMMUNITY
Start: 2022-03-14 | End: 2022-06-28 | Stop reason: SDUPTHER

## 2022-06-27 RX ORDER — LOPERAMIDE HYDROCHLORIDE 2 MG/1
2 CAPSULE ORAL 4 TIMES DAILY PRN
Status: DISCONTINUED | OUTPATIENT
Start: 2022-06-27 | End: 2022-06-28 | Stop reason: HOSPADM

## 2022-06-27 RX ORDER — LANOLIN ALCOHOL/MO/W.PET/CERES
100 CREAM (GRAM) TOPICAL DAILY
Status: DISCONTINUED | OUTPATIENT
Start: 2022-06-27 | End: 2022-06-28 | Stop reason: HOSPADM

## 2022-06-27 RX ORDER — LORAZEPAM 2 MG/ML
2 INJECTION INTRAMUSCULAR
Status: DISCONTINUED | OUTPATIENT
Start: 2022-06-27 | End: 2022-06-28 | Stop reason: HOSPADM

## 2022-06-27 RX ORDER — SODIUM CHLORIDE 9 MG/ML
INJECTION, SOLUTION INTRAVENOUS CONTINUOUS
Status: DISCONTINUED | OUTPATIENT
Start: 2022-06-27 | End: 2022-06-28 | Stop reason: HOSPADM

## 2022-06-27 RX ORDER — ACETAMINOPHEN 325 MG/1
650 TABLET ORAL EVERY 6 HOURS PRN
Status: DISCONTINUED | OUTPATIENT
Start: 2022-06-27 | End: 2022-06-28 | Stop reason: HOSPADM

## 2022-06-27 RX ORDER — POLYETHYLENE GLYCOL 3350, SODIUM SULFATE ANHYDROUS, SODIUM BICARBONATE, SODIUM CHLORIDE, POTASSIUM CHLORIDE 236; 22.74; 6.74; 5.86; 2.97 G/4L; G/4L; G/4L; G/4L; G/4L
4000 POWDER, FOR SOLUTION ORAL ONCE
Status: COMPLETED | OUTPATIENT
Start: 2022-06-27 | End: 2022-06-27

## 2022-06-27 RX ORDER — PANTOPRAZOLE SODIUM 40 MG/10ML
80 INJECTION, POWDER, LYOPHILIZED, FOR SOLUTION INTRAVENOUS
Status: COMPLETED | OUTPATIENT
Start: 2022-06-27 | End: 2022-06-27

## 2022-06-27 RX ORDER — NICOTINE 7MG/24HR
1 PATCH, TRANSDERMAL 24 HOURS TRANSDERMAL DAILY
Status: DISCONTINUED | OUTPATIENT
Start: 2022-06-27 | End: 2022-06-28 | Stop reason: HOSPADM

## 2022-06-27 RX ORDER — HYDRALAZINE HYDROCHLORIDE 20 MG/ML
15 INJECTION INTRAMUSCULAR; INTRAVENOUS EVERY 4 HOURS PRN
Status: DISCONTINUED | OUTPATIENT
Start: 2022-06-27 | End: 2022-06-28

## 2022-06-27 RX ORDER — ATORVASTATIN CALCIUM 10 MG/1
10 TABLET, FILM COATED ORAL DAILY
Status: DISCONTINUED | OUTPATIENT
Start: 2022-06-27 | End: 2022-06-28 | Stop reason: HOSPADM

## 2022-06-27 RX ORDER — OFLOXACIN 3 MG/ML
1 SOLUTION/ DROPS OPHTHALMIC 4 TIMES DAILY
COMMUNITY
Start: 2022-06-22

## 2022-06-27 RX ORDER — LIDOCAINE HYDROCHLORIDE 20 MG/ML
INJECTION INTRAVENOUS
Status: DISCONTINUED | OUTPATIENT
Start: 2022-06-27 | End: 2022-06-27

## 2022-06-27 RX ORDER — FOLIC ACID 1 MG/1
1 TABLET ORAL DAILY
Status: DISCONTINUED | OUTPATIENT
Start: 2022-06-27 | End: 2022-06-28 | Stop reason: HOSPADM

## 2022-06-27 RX ADMIN — PANTOPRAZOLE SODIUM 80 MG: 40 INJECTION, POWDER, FOR SOLUTION INTRAVENOUS at 01:06

## 2022-06-27 RX ADMIN — ATORVASTATIN CALCIUM 10 MG: 10 TABLET, FILM COATED ORAL at 09:06

## 2022-06-27 RX ADMIN — THIAMINE HCL TAB 100 MG 100 MG: 100 TAB at 09:06

## 2022-06-27 RX ADMIN — FOLIC ACID 1 MG: 1 TABLET ORAL at 09:06

## 2022-06-27 RX ADMIN — IOHEXOL 95 ML: 350 INJECTION, SOLUTION INTRAVENOUS at 03:06

## 2022-06-27 RX ADMIN — PANTOPRAZOLE SODIUM 8 MG/HR: 40 INJECTION, POWDER, FOR SOLUTION INTRAVENOUS at 04:06

## 2022-06-27 RX ADMIN — SODIUM CHLORIDE, SODIUM LACTATE, POTASSIUM CHLORIDE, AND CALCIUM CHLORIDE 1000 ML: .6; .31; .03; .02 INJECTION, SOLUTION INTRAVENOUS at 01:06

## 2022-06-27 RX ADMIN — LIDOCAINE HYDROCHLORIDE 100 MG: 20 INJECTION, SOLUTION INTRAVENOUS at 01:06

## 2022-06-27 RX ADMIN — THERA TABS 1 TABLET: TAB at 09:06

## 2022-06-27 RX ADMIN — SODIUM CHLORIDE: 0.9 INJECTION, SOLUTION INTRAVENOUS at 07:06

## 2022-06-27 RX ADMIN — HYDRALAZINE HYDROCHLORIDE 15 MG: 20 INJECTION, SOLUTION INTRAMUSCULAR; INTRAVENOUS at 06:06

## 2022-06-27 RX ADMIN — PANTOPRAZOLE SODIUM 8 MG/HR: 40 INJECTION, POWDER, FOR SOLUTION INTRAVENOUS at 05:06

## 2022-06-27 RX ADMIN — SODIUM CHLORIDE: 0.9 INJECTION, SOLUTION INTRAVENOUS at 10:06

## 2022-06-27 RX ADMIN — PROPOFOL 100 MG: 10 INJECTION, EMULSION INTRAVENOUS at 01:06

## 2022-06-27 RX ADMIN — POLYETHYLENE GLYCOL 3350, SODIUM SULFATE ANHYDROUS, SODIUM BICARBONATE, SODIUM CHLORIDE, POTASSIUM CHLORIDE 4000 ML: 236; 22.74; 6.74; 5.86; 2.97 POWDER, FOR SOLUTION ORAL at 08:06

## 2022-06-27 RX ADMIN — NICOTINE 1 PATCH: 7 PATCH, EXTENDED RELEASE TRANSDERMAL at 10:06

## 2022-06-27 NOTE — PROVIDER PROGRESS NOTES - EMERGENCY DEPT.
Encounter Date: 6/26/2022    ED Physician Progress Notes       SCRIBE NOTE: I, Dc Salazar, am scribing for, and in the presence of,  Cherri Landaverde DO.  Physician Statement: ICherri DO, personally performed the services described in this documentation as scribed by Dc Salazar in my presence, and it is both accurate and complete.     Physician Note:   Per charge nurseCherri, patient is admitted to the hospital and pending bed assignment; however, due to an issue with the order, patient showed up as needing to be seen by a provider. Verified by charge nurse and house supervisor. Patient does not need to be reevaluated and needs to be admitted.

## 2022-06-27 NOTE — CONSULTS
[facility] Consultation Note    Patient Complaint: blood in stool    PCP:   No primary care provider on file.       LOS: 0        Initial History of Present Illness (HPI):  This is a 66 y.o. female consulted to GI service  for GI bleed. Patient reports acute onset dark stools that began on yesterday after [employer] service. Reports having 2 dark stools yesterday afternoon and 2 overnight. Patient endorses feeling weakness and having a syncopal episode while in the ED. Patient denies being constipated and straining to stool. Patient denies nausea and vomiting. Patient reports minimal abdominal pain however does report lower abdominal tenderness. Patient reports taking naproxen at home regularly. Patient is a current smoker of vape and cigarettes. Vapes 1 cartridge weekly and a few cigarettes daily. Patient drinks beer daily, reports eight 12oz bottles of beer and on weekends the addition of 6 bottles of a fifth of whiskey. Denies taking any blood thinners has not eaten since yesterday evening. Hgb 9.7 stable.    Review of Systems   Constitutional: Positive for fatigue. Negative for activity change, chills, diaphoresis and fever.   HENT: Negative for congestion, drooling, rhinorrhea, sore throat and trouble swallowing.    Eyes: Negative for discharge.   Respiratory: Negative for cough, shortness of breath and wheezing.    Cardiovascular: Negative for chest pain, palpitations and leg swelling.   Gastrointestinal: Positive for abdominal pain and blood in stool. Negative for abdominal distention, anal bleeding, constipation, diarrhea, nausea, rectal pain and vomiting.   Endocrine: Negative for cold intolerance and heat intolerance.   Genitourinary: Negative for frequency, hematuria and urgency.        Unclear to patient if blood in urine or stool   Musculoskeletal: Negative for arthralgias.   Skin: Negative for color change, pallor and rash.   Neurological: Positive for syncope and weakness. Negative for  facial asymmetry and numbness.   Psychiatric/Behavioral: Negative for agitation and confusion. The patient is not nervous/anxious.            Medical History:  has a past medical history of Allergic arthritis of left knee, High cholesterol, Hypertension, Lumbago, and Tobacco abuse.    Surgical History:  has a past surgical history that includes Hysterectomy.    Family History: Family history is unknown by patient..     Social History:  reports that she has been smoking cigarettes. She has a 10.00 pack-year smoking history. She has never used smokeless tobacco. She reports current alcohol use. She reports that she does not use drugs.    Review of patient's allergies indicates:  No Known Allergies    No current facility-administered medications on file prior to encounter.     Current Outpatient Medications on File Prior to Encounter   Medication Sig Dispense Refill    ascorbic acid (FARA-C ORAL) Take 1 tablet by mouth once daily.      atorvastatin (LIPITOR) 10 MG tablet Take 10 mg by mouth once daily.      lisinopriL (PRINIVIL,ZESTRIL) 20 MG tablet Take 20 mg by mouth once daily.      ofloxacin (OCUFLOX) 0.3 % ophthalmic solution Place 1 drop into the left eye 4 (four) times daily.      [DISCONTINUED] lisinopril 10 MG tablet Take 10 mg by mouth once daily.      [DISCONTINUED] naproxen (NAPROSYN) 500 MG tablet Take 1 tablet (500 mg total) by mouth 2 (two) times daily with meals. As needed for pain 20 tablet 0        Objective Findings:    Vital Signs:  Temp:  [97.7 °F (36.5 °C)-98.1 °F (36.7 °C)]   Pulse:  [70-88]   Resp:  [15-25]   BP: (120-173)/(58-83)   SpO2:  [96 %-100 %]   Body mass index is 25.69 kg/m².      Physical Exam  Vitals and nursing note reviewed.   Constitutional:       Appearance: Normal appearance.   HENT:      Head: Normocephalic.      Nose: Nose normal.      Mouth/Throat:      Mouth: Mucous membranes are moist.   Eyes:      Pupils: Pupils are equal, round, and reactive to light.    Cardiovascular:      Heart sounds: Normal heart sounds.   Pulmonary:      Effort: Pulmonary effort is normal.      Breath sounds: Normal breath sounds.   Abdominal:      General: Bowel sounds are normal. There is no distension.      Palpations: Abdomen is soft.      Tenderness: There is abdominal tenderness (LLQ and RLQ).   Musculoskeletal:         General: Normal range of motion.      Cervical back: Normal range of motion.   Skin:     Capillary Refill: Capillary refill takes less than 2 seconds.   Neurological:      General: No focal deficit present.      Mental Status: She is alert and oriented to person, place, and time.   Psychiatric:         Mood and Affect: Mood normal.         Behavior: Behavior normal.         Thought Content: Thought content normal.               Labs:  Lab Results   Component Value Date    WBC 4.31 06/27/2022    HGB 9.7 (L) 06/27/2022    HCT 29.2 (L) 06/27/2022     06/27/2022    ALT 14 06/27/2022    AST 15 06/27/2022     06/27/2022    K 3.6 06/27/2022     06/27/2022    CREATININE 0.7 06/27/2022    BUN 11 06/27/2022    CO2 18 (L) 06/27/2022    TSH 1.963 06/27/2022    INR 1.0 06/27/2022             Imaging: CTA bleeding study of abdomen and pelvis- small hiatal hernia, mild gastric wall thickening, colonic diverticulosis. Sigmoid colon mild wall thickening.     I have independently reviewed and interpreted the imaging above    Assessment:  Patient is a .66 y.o. y/o .female with  has a past medical history of Allergic arthritis of left knee, High cholesterol, Hypertension, Lumbago, and Tobacco abuse.  Consulted to the GI service for GI bleed.               Recommendations:  1. GI bleed. Acute blood loss anemia. Melena. CTA-shows diverticulitis which may be source. With patient's report of melena, NSAID use and drinking history. EGD is reasonable. NPO now. EGD today. Hbg is stable. Likey to f/u in clinc for colonoscopy.      Will continue to follow    Thank you so much for  allowing us to participate in the care of Kenya Washington . Please contact us if you have any additional questions.    Marysol Truong NP  Gastroenterology  [facility]

## 2022-06-27 NOTE — PROVATION PATIENT INSTRUCTIONS
Discharge Summary/Instructions after an Endoscopic Procedure  Patient Name: Kenya Washington  Patient MRN: [id number]  Patient YOB: 1955  Monday, June 27, 2022  Dorie Frazier MD  Dear patient,  As a result of recent federal legislation (The Federal Cures Act), you may   receive lab or pathology results from your procedure in your [facility]   account before your physician is able to contact you. Your physician or   their representative will relay the results to you with their   recommendations at their soonest availability.  Thank you,  RESTRICTIONS:  During your procedure today, you received medications for sedation.  These   medications may affect your judgment, balance and coordination.  Therefore,   for 24 hours, you have the following restrictions:   - DO NOT drive a car, operate machinery, make legal/financial decisions,   sign important papers or drink alcohol.    ACTIVITY:  Today: no heavy lifting, straining or running due to procedural   sedation/anesthesia.  The following day: return to full activity including work.  DIET:  Eat and drink normally unless instructed otherwise.     TREATMENT FOR COMMON SIDE EFFECTS:  - Mild abdominal pain, nausea, belching, bloating or excessive gas:  rest,   eat lightly and use a heating pad.  - Sore Throat: treat with throat lozenges and/or gargle with warm salt   water.  - Because air was used during the procedure, expelling large amounts of air   from your rectum or belching is normal.  - If a bowel prep was taken, you may not have a bowel movement for 1-3 days.    This is normal.  SYMPTOMS TO WATCH FOR AND REPORT TO YOUR PHYSICIAN:  1. Abdominal pain or bloating, other than gas cramps.  2. Chest pain.  3. Back pain.  4. Signs of infection such as: chills or fever occurring within 24 hours   after the procedure.  5. Rectal bleeding, which would show as bright red, maroon, or black stools.   (A tablespoon of blood from the rectum is not serious, especially if    hemorrhoids are present.)  6. Vomiting.  7. Weakness or dizziness.  GO DIRECTLY TO THE NEAREST EMERGENCY ROOM IF YOU HAVE ANY OF THE FOLLOWING:      Difficulty breathing              Chills and/or fever over 101 F   Persistent vomiting and/or vomiting blood   Severe abdominal pain   Severe chest pain   Black, tarry stools   Bleeding- more than one tablespoon   Any other symptom or condition that you feel may need urgent attention  Your doctor recommends these additional instructions:  If any biopsies were taken, your doctors clinic will contact you in 1 to 2   weeks with any results.  - Return patient to hospital head for ongoing care.   - Clear liquid diet.   - Continue present medications.   - Recommend a colonoscopy tomorrow for further evaluation.  - Check an H.  pylori stool antigen.  For questions, problems or results please call your physician - Dorie Frazier MD at Work:  [phone].  [facility] can be reached at [phone]     IF A COMPLICATION OR EMERGENCY SITUATION ARISES AND YOU ARE UNABLE TO REACH   YOUR PHYSICIAN - GO DIRECTLY TO THE EMERGENCY ROOM.  Dorie Frazier MD  6/27/2022 1:42:51 PM  This report has been verified and signed electronically.  Dear patient,  As a result of recent federal legislation (The Federal Cures Act), you may   receive lab or pathology results from your procedure in your [facility]   account before your physician is able to contact you. Your physician or   their representative will relay the results to you with their   recommendations at their soonest availability.  Thank you,  PROVATION

## 2022-06-27 NOTE — PLAN OF CARE
06/27/22 1231   Discharge Planning   Assessment Type Discharge Planning Brief Assessment   Resource/Environmental Concerns none   Support Systems Family members   Equipment Currently Used at Home none   Current Living Arrangements home/apartment/condo   Patient/Family Anticipates Transition to home   Patient/Family Anticipated Services at Transition none   DME Needed Upon Discharge  none   Discharge Plan A Home  (with instructions to follow up)

## 2022-06-27 NOTE — H&P
[facility] Medicine  History & Physical    Patient Name: Kenya Washington  MRN: [id number]  Patient Class: OP- Observation  Admission Date: 6/27/2022  Attending Physician: Dillon Newsome MD  Primary Care Provider: No primary care provider on file.         Patient information was obtained from patient, relative(s), past medical records and ER records.     Subjective:     Principal Problem:Acute GI bleeding    Chief Complaint:   Chief Complaint   Patient presents with    Hematuria     Pt presents to ED c/o blood in urine started today.  Denies fever, chills, n/v. Abd/back pain or frequent urination. Pain 0/10.          HPI: Ms. Washington is a 66 year old woman with hypertension, hyperlipidemia, tobacco abuse, lumbago and arthritis of her left knee who presented with chief complaint of hematuria.  In the ER, she actually had a large dark and bloody bowel movement and passed out. She states she has some mild diffuse abdominal discomfort worse on the left lower quadrant.  She described it as a squeezing and burning sensation rated 4/10 in intensity.  She denies taking NSAIDs and goodies powder but notes she does take something for her arthritis.  Review of outside records from PCP office noted the following medications:  naproxen, meloxicam, norco, zanaflex and robaxin.  She is unsure which if any of these she is taking.  She denies fevers, chills, palpitations, chest pain, shortness of breath, headache, cough, nausea, vomiting and diarrhea.      Past Medical History:   Diagnosis Date    Allergic arthritis of left knee     High cholesterol     Hypertension     Lumbago     Tobacco abuse        Past Surgical History:   Procedure Laterality Date    HYSTERECTOMY         Review of patient's allergies indicates:  No Known Allergies    No current facility-administered medications on file prior to encounter.     Current Outpatient Medications on File Prior to Encounter   Medication Sig    atorvastatin  (LIPITOR) 10 MG tablet Take 10 mg by mouth once daily.    lisinopril 10 MG tablet Take 10 mg by mouth once daily.    naproxen (NAPROSYN) 500 MG tablet Take 1 tablet (500 mg total) by mouth 2 (two) times daily with meals. As needed for pain     Family History       Family history is unknown by patient.          Tobacco Use    Smoking status: Current Every Day Smoker     Packs/day: 0.50     Years: 20.00     Pack years: 10.00     Types: Cigarettes    Smokeless tobacco: Never Used   Substance and Sexual Activity    Alcohol use: Yes     Comment: occaisionally    Drug use: Never    Sexual activity: Not Currently     Review of Systems   Constitutional:  Positive for activity change, appetite change and fatigue. Negative for chills, diaphoresis and fever.   HENT:  Negative for congestion, drooling and hearing loss.    Eyes:  Negative for discharge.   Respiratory:  Negative for apnea, chest tightness, shortness of breath and wheezing.    Cardiovascular:  Negative for palpitations and leg swelling.   Gastrointestinal:  Negative for abdominal distention, abdominal pain, constipation and diarrhea.   Genitourinary:  Positive for hematuria. Negative for difficulty urinating, dysuria, flank pain and frequency.   Musculoskeletal:  Negative for arthralgias and gait problem.   Skin:  Negative for rash.   Neurological:  Positive for syncope and weakness. Negative for seizures, light-headedness and numbness.   Hematological:  Negative for adenopathy.   Psychiatric/Behavioral:  Negative for agitation and behavioral problems.    Objective:     Vital Signs (Most Recent):  Temp: 97.7 °F (36.5 °C) (06/27/22 0900)  Pulse: 70 (06/27/22 0900)  Resp: 15 (06/27/22 0900)  BP: (!) 144/67 (06/27/22 0900)  SpO2: 100 % (06/27/22 0900)   Vital Signs (24h Range):  Temp:  [97.7 °F (36.5 °C)-98.1 °F (36.7 °C)] 97.7 °F (36.5 °C)  Pulse:  [70-88] 70  Resp:  [15-25] 15  SpO2:  [96 %-100 %] 100 %  BP: (120-173)/(58-83) 144/67     Weight: 65.8 kg (145  lb)  Body mass index is 25.69 kg/m².    Physical Exam  Vitals and nursing note reviewed.   Constitutional:       General: She is not in acute distress.     Appearance: She is well-developed. She is not ill-appearing or toxic-appearing.      Comments: Appears very weak and lethargic   HENT:      Head: Normocephalic and atraumatic.      Right Ear: External ear normal.      Left Ear: External ear normal.      Nose: Nose normal.      Mouth/Throat:      Mouth: Mucous membranes are moist.   Eyes:      Extraocular Movements: Extraocular movements intact.      Conjunctiva/sclera: Conjunctivae normal.   Cardiovascular:      Rate and Rhythm: Normal rate and regular rhythm.      Heart sounds: Murmur heard.   Pulmonary:      Effort: Pulmonary effort is normal. No respiratory distress.      Breath sounds: Normal breath sounds.   Abdominal:      General: Bowel sounds are normal. There is no distension.      Palpations: Abdomen is soft.      Comments: Mild diffuse TTP without rebound.   Genitourinary:     Comments: Performed in ER - dark red blood  Musculoskeletal:         General: Normal range of motion.      Cervical back: Normal range of motion. No rigidity.   Skin:     General: Skin is warm and dry.   Neurological:      Mental Status: She is alert and oriented to person, place, and time.      Cranial Nerves: No cranial nerve deficit.      Coordination: Coordination normal.   Psychiatric:         Behavior: Behavior normal.           Significant Labs: All pertinent labs within the past 24 hours have been reviewed.    Significant Imaging: I have reviewed all pertinent imaging results/findings within the past 24 hours.    Assessment/Plan:     * Acute GI bleeding  -Placed in observation  -Noted dark bloody bowel movement with syncope in ER  -Initial Hb 11.3 but subsequent measurement dropped to 9.9  -INR and platelets are normal.  -CTA abdomen showed no clear source of bleeding - specifically there was, colonic diverticulosis, a  short segment of sigmoid colon with questionable mild wall thickening and adjacent stranding in a region of diverticula, nevidence of free intraperitoneal air or abscess, mild gastric wall thickening involving the gastric fundus and body.  While this may relate to nondistention, there nonspecific gastritis is not excluded and a nonobstructing right renal calculi.  -Suspect diverticular bleed - but cannot rule out PUD due to NSAIDs or EtOH related gastropathy.  -Monitor H/H q8h and transfuse to keep PRBC > 7  -Check H.pylori stool Ag.  -Will treat with IV PPI infusion  -Consult GI for evaluation - Will likely at least need EGD    Syncope  -Occurred in ER waiting room - no trauma suffered  -Associated with GI bleeding - most likely vasovagal related to GI bleeding  -Monitor on telemetry  -Hold home lisinopril for now.  -Check orthostatic vitals.    Alcoholic intoxication without complication  -Noted recent increase in alcohol consumption due to stress  -Unclear exactly how much she drinks at baseline - she was very vague in response to those questions and her family was in the room (Son and Niece).  Need to get more information/collateral  -On admit EtOH 129  -No signs of withdrawal at this time  -Monitor CIWA closely.  -Provide folate, thiamine and MVI    Lumbago  -History noted  -Denies back pain at this time  -No NSAIDs.    Tobacco abuse  -Counselled on cessation 5 minutes.  -NRT ordered.    HLD (hyperlipidemia)  -Continue home statin    Essential hypertension  -BP consistently mildly elevated  -Holding home lisinopril for now given syncope and GI bleeding  -Provide PRN hydralazine      VTE Risk Mitigation (From admission, onward)         Ordered     Place sequential compression device  Until discontinued         06/27/22 0609                   Dillon Newsome MD  Department of Hospital Medicine   [facility] [place]

## 2022-06-27 NOTE — ED NOTES
Spoke with Brunilda from [facility] who stated that the endoscopy showed 2cm hiatal hernia and redness to the pyloric area. Pt. Is going to be on a clear liquid diet. Pt. Is going to have a colonoscopy the in morning.

## 2022-06-27 NOTE — PLAN OF CARE
Patient alert, oriented; denies any pain nor any complaints; has her partial denture and white color jewelry. Recovery criteria met, instructions given re:clear liquid diet and colonoscopy tomorrow. Patient's sister is down in her ED room, not in the waiting room. Handoff given to ZHOA Marie.  Patient transported by stretcher.

## 2022-06-27 NOTE — PHARMACY MED REC
"Admission Medication History     The home medication history was taken by Melissa Rogers.    You may go to "Admission" then "Reconcile Home Medications" tabs to review and/or act upon these items.      The home medication list has been updated by the Pharmacy department.    Please read ALL comments highlighted in yellow.    Please address this information as you see fit.     Feel free to contact us if you have any questions or require assistance.      The medications listed below were removed from the home medication list. Please reorder if appropriate:  Patient reports no longer taking the following medication(s):   Naprosyn 500 mg      Medications listed below were obtained from: Patient/family and Analytic software- [employer]  (Not in a hospital admission)        Melissa Rogers [facility].  [phone]                    .          "

## 2022-06-27 NOTE — HPI
Ms. Washington is a 66 year old woman with hypertension, hyperlipidemia, tobacco abuse, lumbago and arthritis of her left knee who presented with chief complaint of hematuria.  In the ER, she actually had a large dark and bloody bowel movement and passed out. She states she has some mild diffuse abdominal discomfort worse on the left lower quadrant.  She described it as a squeezing and burning sensation rated 4/10 in intensity.  She denies taking NSAIDs and goodies powder but notes she does take something for her arthritis.  Review of outside records from PCP office noted the following medications:  naproxen, meloxicam, norco, zanaflex and robaxin.  She is unsure which if any of these she is taking.  She denies fevers, chills, palpitations, chest pain, shortness of breath, headache, cough, nausea, vomiting and diarrhea.   PCP:  Dr. Rufina Moulton    Chief Complaint:   Generalized weakness    History of Present Illness:   Patient is an elderly averagely bed  gentleman with a below mentioned past medical history who lives at the [facility].Evidently he had been doing fairly well up until about the day in the morning hours when he started to complain about feeling generally weak.  He had little bit of dizziness and some feeling of tiredness which when he complained about to the nursing personnel at the facility, he was sent over to the hospital for further evaluation.  Workup initiated in the emergency room and now admitted for the same.  Seeing the patient on the floor now, he denies any other symptoms except weakness.  On direct questioning he does bring about some burning and stinging in the urine at times.    Past Medical History:    Past Medical History:   Diagnosis Date   • A-fib (CMS/HCC)    • Cardiac pacemaker    • Diabetes mellitus (CMS/HCC)    • Prostate cancer (CMS/HCC)    • RAD (reactive airway disease)         Surgical History:   Past Surgical History:   Procedure Laterality Date   • Cardiac catherization     • Prostate surgery          Family History:  Not pertinent to the present problem    Social History:   Alcohol Use: Never              Drug Use:    Never            Social History     Tobacco Use   Smoking Status Never Smoker   Smokeless Tobacco Never Used        Allergies:   ALLERGIES:   Allergen Reactions   • Cheese   (Food Or Med) Other (See Comments)     Cough/asthma attack        Medications: See EMR. Medication Reconciliation Form Reviewed    Review of Systems:  General: Denies fever, chills, or malaise.  Skin: Denies pruritus or rashes.  Head and Neck: Denies neck pain or stiffness.  No headaches.  EENT: Denies visual changes, diplopia or blurred vision. Denies current congestion, runny nose or sneezing.  Chest and Lungs: Denies difficulty breathing or shortness of breath with or without exertion.   No sputum changes.  Cardiovascular: Denies chest pain or palpitations.  No edema or claudication.  Gastrointestinal: Denies abdominal pain, heartburn or diarrhea.  No nausea, vomiting or constipation.  No hematemesis or blood in stools.  Genitourinary: Denies flank pain or dysuria.  No hematuria.  Musculoskeletal: Denies joint pain, redness or swelling.  Denies difficulty with range of motion.  Neurologic: Denies dizziness or syncope.      Physical Examination:   Mental Status: Alert and oriented.  Follows commands appropriately.    Vitals:    12/23/19 1411   BP: 168/82   Pulse: 82   Resp: 16   Temp: 96.8 °F (36 °C)        ENT: PERRL. EOM intact  Neck: Supple, No JVD  Respiratory: Clear to auscultation bilaterally  CV: S1, S2. RRR.  GI: Abdomen soft, nontender.  Active bowel sounds in all four quadrants.  :Deferred  Extremities: No peripheral edema.  Skin: Pink, warm and dry.    Labs:   Recent Results (from the past 24 hour(s))   URINALYSIS WITH MICRO & CULTURE IF INDICATED    Collection Time: 12/23/19  9:30 AM   Result Value Ref Range    COLOR YELLOW YELLOW    APPEARANCE HAZY     GLUCOSE(URINE) NEGATIVE NEGATIVE mg/dL    BILIRUBIN NEGATIVE NEGATIVE    KETONES NEGATIVE NEGATIVE mg/dL    SPECIFIC GRAVITY 1.020 1.005 - 1.030    BLOOD LARGE (A) NEGATIVE    pH 6.5 5.0 - 7.0 Units    PROTEIN(URINE) 30 (A) NEGATIVE mg/dL    UROBILINOGEN 0.2 0.0 - 1.0 mg/dL    NITRITE NEGATIVE NEGATIVE    LEUKOCYTE ESTERASE MODERATE (A) NEGATIVE    Squamous EPI'S 1 to 5 0 - 5 /hpf    RBC >100 (H) 0 - 2 /hpf    WBC 26 to 100 0 - 5 /hpf    BACTERIA NONE SEEN NONE SEEN /hpf    Hyaline Casts NONE SEEN 0 - 5 /lpf    SPECIMEN TYPE URINE CLEAN CATCH     TRANSITIONAL EPI'S 1 to 5 /hpf    RENAL EPI'S 1 to 5 /hpf   Troponin I Ultra Sensitive    Collection Time: 12/23/19  9:50 AM   Result Value Ref Range    TROPONIN I <0.02 <0.05 ng/mL   CBC with Automated Differential    Collection Time: 12/23/19 10:30 AM   Result Value Ref Range    WBC 6.0 4.2 -  11.0 K/mcL    RBC 2.87 (L) 4.50 - 5.90 mil/mcL    HGB 9.4 (L) 13.0 - 17.0 g/dL    HCT 28.8 (L) 39.0 - 51.0 %    .3 (H) 78.0 - 100.0 fl    MCH 32.8 26.0 - 34.0 pg    MCHC 32.6 32.0 - 36.5 g/dL    RDW-CV 14.3 11.0 - 15.0 %     140 - 450 K/mcL    NRBC 0 0 /100 WBC    DIFF TYPE AUTOMATED DIFFERENTIAL     Neutrophil 78 %    LYMPH 9 %    MONO 11 %    EOSIN 1 %    BASO 0 %    Percent Immature Granuloctyes 1 %    Absolute Neutrophil 4.8 1.8 - 7.7 K/mcL    Absolute Lymph 0.5 (L) 1.0 - 4.0 K/mcL    Absolute Mono 0.6 0.3 - 0.9 K/mcL    Absolute Eos 0.1 0.1 - 0.5 K/mcL    Absolute Baso 0.0 0.0 - 0.3 K/mcL    Absolute Immature Granulocytes 0.0 0 - 0.2 K/mcl   Prothrombin Time    Collection Time: 12/23/19 10:30 AM   Result Value Ref Range    PROTIME 11.1 9.7 - 11.8 sec    INR 1.1    Partial Thromboplastin Time    Collection Time: 12/23/19 10:30 AM   Result Value Ref Range    PTT 27 22 - 32 sec   COMPREHENSIVE METABOLIC PANEL    Collection Time: 12/23/19 10:30 AM   Result Value Ref Range    Sodium 134 (L) 135 - 145 mmol/L    Potassium 4.5 3.4 - 5.1 mmol/L    Chloride 100 98 - 107 mmol/L    Carbon Dioxide 23 21 - 32 mmol/L    Anion Gap 16 10 - 20 mmol/L    Glucose 269 (H) 65 - 99 mg/dL    BUN 23 (H) 6 - 20 mg/dL    Creatinine 1.21 (H) 0.67 - 1.17 mg/dL    GFR Estimate,  61     GFR Estimate, Non African American 52     BUN/Creatinine Ratio 19 7 - 25    CALCIUM 8.5 8.4 - 10.2 mg/dL    TOTAL BILIRUBIN 0.3 0.2 - 1.0 mg/dL    AST/SGOT 20 <38 Units/L    ALT/SGPT 30 <64 Units/L    ALK PHOSPHATASE 77 45 - 117 Units/L    TOTAL PROTEIN 7.0 6.4 - 8.2 g/dL    Albumin 2.8 (L) 3.6 - 5.1 g/dL    GLOBULIN 4.2 (H) 2.0 - 4.0 g/dL    A/G Ratio, Serum 0.7 (L) 1.0 - 2.4   LACTIC ACID, VENOUS    Collection Time: 12/23/19 10:30 AM   Result Value Ref Range    Lactic Acid Venous 1.9 0 - 2.0 mmol/L   Magnesium    Collection Time: 12/23/19 10:30 AM   Result Value Ref Range    MAGNESIUM 1.3 (L) 1.7 - 2.4 mg/dL   Troponin I Ultra  Sensitive    Collection Time: 12/23/19 10:30 AM   Result Value Ref Range    TROPONIN I <0.02 <0.05 ng/mL   Electrocardiogram 12-Lead    Collection Time: 12/23/19  1:47 PM   Result Value Ref Range    Ventricular Rate EKG/Min (BPM) 82     Atrial Rate (BPM) 82     LA-Interval (MSEC) 136     QRS-Interval (MSEC) 136     QT-Interval (MSEC) 436     QTc 509     P Axis (Degrees) 50     R Axis (Degrees) -167     T Axis (Degrees) 6     REPORT TEXT       Sinus rhythm  with occasional  premature ventricular complexes  Nonspecific intraventricular block  Cannot rule out  Septal infarct  (cited on or before  23-DEC-2019)  Lateral infarct  (cited on or before  23-DEC-2019)  Abnormal ECG  When compared with ECG of  23-DEC-2019 09:40,  fusion complexes  are no longer  present  premature ventricular complexes  are now  present  Serial changes of  Septal infarct  present     Troponin I Ultra Sensitive    Collection Time: 12/23/19  2:50 PM   Result Value Ref Range    TROPONIN I 0.02 <0.05 ng/mL   Metered blood glucose    Collection Time: 12/23/19  3:26 PM   Result Value Ref Range    Glucose Bedside  (H) 70 - 99 mg/dL          Assessment/ Plan:  89-year-old gentleman coming in with generalized weakness some dizziness and deemed to be having below mentioned comorbidities and being intervened accordingly.  Generalized weakness  Dizziness  Some change in mental status  Chronic atrial fibrillation  Hypomagnesemia  Orthostatic hypotension  Diabetes mellitus type 2 with hyperglycemia  Acute urinary retention    Next Steps:  Empiric antibiotics to be instituted  Follow urine cultures  Rate control for atrial fibrillation  Anticoagulation to be monitored closely by pharmacy services  Replace electrolytes  Check labs serially  Follow Accu-Cheks and titrate medications to need  Rule out bladder outlet obstruction  Symptom treatment to be targeted  PT OT and rehabilitation effort to be put in place  Check vitamin D levels    Disposition    Extended care facility when able, likely in the upcoming 24 hours  Change antibiotics to oral upon discharge  Case discussed with the patient management and plan laid out and all questions answered

## 2022-06-27 NOTE — OR NURSING
Dr Frazier came in and talked to the patient re:procedure findings/recommendations; for colonoscopy tomorrow.

## 2022-06-27 NOTE — ANESTHESIA POSTPROCEDURE EVALUATION
Anesthesia Post Evaluation    Patient: Kenya Washington    Procedure(s) Performed: Procedure(s) (LRB):  EGD (ESOPHAGOGASTRODUODENOSCOPY) (N/A)    Final Anesthesia Type: general      Patient location during evaluation: GI PACU  Patient participation: Yes- Able to Participate  Level of consciousness: awake and alert and oriented  Post-procedure vital signs: reviewed and stable  Pain management: adequate  Airway patency: patent    PONV status at discharge: No PONV  Anesthetic complications: no      Cardiovascular status: blood pressure returned to baseline and hemodynamically stable  Respiratory status: unassisted, spontaneous ventilation and room air  Hydration status: euvolemic  Follow-up not needed.          Vitals Value Taken Time   /69 06/27/22 1339   Temp 36.8 °C (98.3 °F) 06/27/22 1339   Pulse 86 06/27/22 1339   Resp 16 06/27/22 1339   SpO2 96 % 06/27/22 1339         No case tracking events are documented in the log.      Pain/Amirah Score: Amirah Score: 10 (6/27/2022  1:53 PM)

## 2022-06-27 NOTE — ASSESSMENT & PLAN NOTE
-Occurred in ER waiting room - no trauma suffered  -Associated with GI bleeding - most likely vasovagal related to GI bleeding  -Monitor on telemetry  -Hold home lisinopril for now.  -Check orthostatic vitals.

## 2022-06-27 NOTE — ANESTHESIA PREPROCEDURE EVALUATION
06/27/2022    Pre-operative evaluation for Procedure(s) (LRB):  EGD (ESOPHAGOGASTRODUODENOSCOPY) (N/A)    Kenya Washington is a 66 y.o. female     Patient Active Problem List   Diagnosis    Acute GI bleeding    Syncope    Essential hypertension    HLD (hyperlipidemia)    Tobacco abuse    Lumbago    Alcoholic intoxication without complication       Review of patient's allergies indicates:  No Known Allergies    No current facility-administered medications on file prior to encounter.     Current Outpatient Medications on File Prior to Encounter   Medication Sig Dispense Refill    ascorbic acid (FARA-C ORAL) Take 1 tablet by mouth once daily.      atorvastatin (LIPITOR) 10 MG tablet Take 10 mg by mouth once daily.      lisinopriL (PRINIVIL,ZESTRIL) 20 MG tablet Take 20 mg by mouth once daily.      ofloxacin (OCUFLOX) 0.3 % ophthalmic solution Place 1 drop into the left eye 4 (four) times daily.         Past Surgical History:   Procedure Laterality Date    HYSTERECTOMY       VITALS  Vitals:    06/27/22 0900   BP: (!) 144/67   Pulse: 70   Resp: 15   Temp: 36.5 °C (97.7 °F)       LABS  CBC:   Recent Labs     06/27/22  0341 06/27/22  0700   WBC 4.10 4.31   RBC 3.53* 3.47*   HGB 9.9* 9.7*   HCT 30.5* 29.2*    170   MCV 86 84   MCH 28.0 28.0   MCHC 32.5 33.2       CMP:   Recent Labs     06/27/22  0225      K 3.6      CO2 18*   BUN 11   CREATININE 0.7      MG 1.6   CALCIUM 8.6*   ALBUMIN 3.3*   PROT 7.1   ALKPHOS 78   ALT 14   AST 15   BILITOT 0.3       INR  Recent Labs     06/27/22  0132   INR 1.0   APTT 22.2     Pre-op Assessment    I have reviewed the Patient Summary Reports.    I have reviewed the NPO Status.   I have reviewed the Medications.     Review of Systems  Anesthesia Hx:  No problems with previous Anesthesia  Denies Family Hx of Anesthesia complications.   Denies  Personal Hx of Anesthesia complications.   Social:  Smoker    Hematology/Oncology:         -- Anemia:   EENT/Dental:EENT/Dental Normal   Cardiovascular:   Exercise tolerance: good Hypertension hyperlipidemia    Pulmonary:  Pulmonary Normal    Hepatic/GI:   Acute GI bleed  Melena  Pt denies hematemesis; denies vomiting   Neurological:  Neurology Normal  Denies CVA. Denies Seizures.    Endocrine:  Endocrine Normal    Psych:  Psychiatric Normal           Physical Exam  General: Well nourished, Alert, Cooperative and Oriented    Airway:  Mouth Opening: Normal  TM Distance: Normal  Neck ROM: Normal ROM    Dental:  Dentures    Chest/Lungs:  Normal Respiratory Rate    Heart:  Rate: Normal  Rhythm: Regular Rhythm  Sounds: Normal        Anesthesia Plan  Type of Anesthesia, risks & benefits discussed:    Anesthesia Type: Gen Natural Airway  Intra-op Monitoring Plan: Standard ASA Monitors  Induction:  IV  Informed Consent: Informed consent signed with the Patient and all parties understand the risks and agree with anesthesia plan.  All questions answered.   ASA Score: 2    Ready For Surgery From Anesthesia Perspective.     .

## 2022-06-27 NOTE — ASSESSMENT & PLAN NOTE
-BP consistently mildly elevated  -Holding home lisinopril for now given syncope and GI bleeding  -Provide PRN hydralazine

## 2022-06-27 NOTE — ASSESSMENT & PLAN NOTE
-Noted recent increase in alcohol consumption due to stress  -Unclear exactly how much she drinks at baseline - she was very vague in response to those questions and her family was in the room (Son and Niece).  Need to get more information/collateral  -On admit EtOH 129  -No signs of withdrawal at this time  -Monitor CIWA closely.  -Provide folate, thiamine and MVI

## 2022-06-27 NOTE — SUBJECTIVE & OBJECTIVE
Past Medical History:   Diagnosis Date    Allergic arthritis of left knee     High cholesterol     Hypertension     Lumbago     Tobacco abuse        Past Surgical History:   Procedure Laterality Date    HYSTERECTOMY         Review of patient's allergies indicates:  No Known Allergies    No current facility-administered medications on file prior to encounter.     Current Outpatient Medications on File Prior to Encounter   Medication Sig    atorvastatin (LIPITOR) 10 MG tablet Take 10 mg by mouth once daily.    lisinopril 10 MG tablet Take 10 mg by mouth once daily.    naproxen (NAPROSYN) 500 MG tablet Take 1 tablet (500 mg total) by mouth 2 (two) times daily with meals. As needed for pain     Family History       Family history is unknown by patient.          Tobacco Use    Smoking status: Current Every Day Smoker     Packs/day: 0.50     Years: 20.00     Pack years: 10.00     Types: Cigarettes    Smokeless tobacco: Never Used   Substance and Sexual Activity    Alcohol use: Yes     Comment: occaisionally    Drug use: Never    Sexual activity: Not Currently     Review of Systems   Constitutional:  Positive for activity change, appetite change and fatigue. Negative for chills, diaphoresis and fever.   HENT:  Negative for congestion, drooling and hearing loss.    Eyes:  Negative for discharge.   Respiratory:  Negative for apnea, chest tightness, shortness of breath and wheezing.    Cardiovascular:  Negative for palpitations and leg swelling.   Gastrointestinal:  Negative for abdominal distention, abdominal pain, constipation and diarrhea.   Genitourinary:  Positive for hematuria. Negative for difficulty urinating, dysuria, flank pain and frequency.   Musculoskeletal:  Negative for arthralgias and gait problem.   Skin:  Negative for rash.   Neurological:  Positive for syncope and weakness. Negative for seizures, light-headedness and numbness.   Hematological:  Negative for adenopathy.   Psychiatric/Behavioral:  Negative  for agitation and behavioral problems.    Objective:     Vital Signs (Most Recent):  Temp: 97.7 °F (36.5 °C) (06/27/22 0900)  Pulse: 70 (06/27/22 0900)  Resp: 15 (06/27/22 0900)  BP: (!) 144/67 (06/27/22 0900)  SpO2: 100 % (06/27/22 0900)   Vital Signs (24h Range):  Temp:  [97.7 °F (36.5 °C)-98.1 °F (36.7 °C)] 97.7 °F (36.5 °C)  Pulse:  [70-88] 70  Resp:  [15-25] 15  SpO2:  [96 %-100 %] 100 %  BP: (120-173)/(58-83) 144/67     Weight: 65.8 kg (145 lb)  Body mass index is 25.69 kg/m².    Physical Exam  Vitals and nursing note reviewed.   Constitutional:       General: She is not in acute distress.     Appearance: She is well-developed. She is not ill-appearing or toxic-appearing.      Comments: Appears very weak and lethargic   HENT:      Head: Normocephalic and atraumatic.      Right Ear: External ear normal.      Left Ear: External ear normal.      Nose: Nose normal.      Mouth/Throat:      Mouth: Mucous membranes are moist.   Eyes:      Extraocular Movements: Extraocular movements intact.      Conjunctiva/sclera: Conjunctivae normal.   Cardiovascular:      Rate and Rhythm: Normal rate and regular rhythm.      Heart sounds: Murmur heard.   Pulmonary:      Effort: Pulmonary effort is normal. No respiratory distress.      Breath sounds: Normal breath sounds.   Abdominal:      General: Bowel sounds are normal. There is no distension.      Palpations: Abdomen is soft.      Comments: Mild diffuse TTP without rebound.   Genitourinary:     Comments: Performed in ER - dark red blood  Musculoskeletal:         General: Normal range of motion.      Cervical back: Normal range of motion. No rigidity.   Skin:     General: Skin is warm and dry.   Neurological:      Mental Status: She is alert and oriented to person, place, and time.      Cranial Nerves: No cranial nerve deficit.      Coordination: Coordination normal.   Psychiatric:         Behavior: Behavior normal.           Significant Labs: All pertinent labs within the past  24 hours have been reviewed.    Significant Imaging: I have reviewed all pertinent imaging results/findings within the past 24 hours.

## 2022-06-27 NOTE — ASSESSMENT & PLAN NOTE
-Placed in observation  -Noted dark bloody bowel movement with syncope in ER  -Initial Hb 11.3 but subsequent measurement dropped to 9.9  -INR and platelets are normal.  -CTA abdomen showed no clear source of bleeding - specifically there was, colonic diverticulosis, a short segment of sigmoid colon with questionable mild wall thickening and adjacent stranding in a region of diverticula, nevidence of free intraperitoneal air or abscess, mild gastric wall thickening involving the gastric fundus and body.  While this may relate to nondistention, there nonspecific gastritis is not excluded and a nonobstructing right renal calculi.  -Suspect diverticular bleed - but cannot rule out PUD due to NSAIDs or EtOH related gastropathy.  -Monitor H/H q8h and transfuse to keep PRBC > 7  -Check H.pylori stool Ag.  -Will treat with IV PPI infusion  -Consult GI for evaluation - Will likely at least need EGD

## 2022-06-27 NOTE — TRANSFER OF CARE
"Anesthesia Transfer of Care Note    Patient: Kenya Washington    Procedure(s) Performed: Procedure(s) (LRB):  EGD (ESOPHAGOGASTRODUODENOSCOPY) (N/A)    Patient location: GI    Anesthesia Type: epidural    Transport from OR: Transported from OR on room air with adequate spontaneous ventilation    Post pain: adequate analgesia    Post assessment: no apparent anesthetic complications and tolerated procedure well    Post vital signs: stable    Level of consciousness: awake    Nausea/Vomiting: no nausea/vomiting    Complications: none    Transfer of care protocol was followed      Last vitals:   Visit Vitals  BP (!) 153/69 (BP Location: Left arm, Patient Position: Lying)   Pulse 86   Temp 36.8 °C (98.3 °F) (Oral)   Resp 16   Ht 5' 3" (1.6 m)   Wt 65.8 kg (145 lb)   SpO2 96%   Breastfeeding No   BMI 25.69 kg/m²     "

## 2022-06-28 ENCOUNTER — ANESTHESIA EVENT (OUTPATIENT)
Dept: ENDOSCOPY | Facility: HOSPITAL | Age: 67
End: 2022-06-28
Payer: COMMERCIAL

## 2022-06-28 ENCOUNTER — ANESTHESIA (OUTPATIENT)
Dept: ENDOSCOPY | Facility: HOSPITAL | Age: 67
End: 2022-06-28
Payer: COMMERCIAL

## 2022-06-28 VITALS
WEIGHT: 144.63 LBS | OXYGEN SATURATION: 98 % | HEART RATE: 67 BPM | HEIGHT: 63 IN | SYSTOLIC BLOOD PRESSURE: 170 MMHG | BODY MASS INDEX: 25.62 KG/M2 | DIASTOLIC BLOOD PRESSURE: 80 MMHG | TEMPERATURE: 98 F | RESPIRATION RATE: 19 BRPM

## 2022-06-28 LAB
BASOPHILS # BLD AUTO: 0.03 K/UL (ref 0–0.2)
BASOPHILS # BLD AUTO: 0.03 K/UL (ref 0–0.2)
BASOPHILS NFR BLD: 0.6 % (ref 0–1.9)
BASOPHILS NFR BLD: 0.6 % (ref 0–1.9)
DIFFERENTIAL METHOD: ABNORMAL
DIFFERENTIAL METHOD: ABNORMAL
EOSINOPHIL # BLD AUTO: 0.1 K/UL (ref 0–0.5)
EOSINOPHIL # BLD AUTO: 0.1 K/UL (ref 0–0.5)
EOSINOPHIL NFR BLD: 2.1 % (ref 0–8)
EOSINOPHIL NFR BLD: 2.1 % (ref 0–8)
ERYTHROCYTE [DISTWIDTH] IN BLOOD BY AUTOMATED COUNT: 13.5 % (ref 11.5–14.5)
ERYTHROCYTE [DISTWIDTH] IN BLOOD BY AUTOMATED COUNT: 13.6 % (ref 11.5–14.5)
HCT VFR BLD AUTO: 29.5 % (ref 37–48.5)
HCT VFR BLD AUTO: 30.9 % (ref 37–48.5)
HCT VFR BLD AUTO: 32 % (ref 37–48.5)
HGB BLD-MCNC: 10.3 G/DL (ref 12–16)
HGB BLD-MCNC: 9.6 G/DL (ref 12–16)
HGB BLD-MCNC: 9.9 G/DL (ref 12–16)
IMM GRANULOCYTES # BLD AUTO: 0.01 K/UL (ref 0–0.04)
IMM GRANULOCYTES # BLD AUTO: 0.01 K/UL (ref 0–0.04)
IMM GRANULOCYTES NFR BLD AUTO: 0.2 % (ref 0–0.5)
IMM GRANULOCYTES NFR BLD AUTO: 0.2 % (ref 0–0.5)
LYMPHOCYTES # BLD AUTO: 2 K/UL (ref 1–4.8)
LYMPHOCYTES # BLD AUTO: 2.2 K/UL (ref 1–4.8)
LYMPHOCYTES NFR BLD: 43.1 % (ref 18–48)
LYMPHOCYTES NFR BLD: 46.9 % (ref 18–48)
MCH RBC QN AUTO: 27.2 PG (ref 27–31)
MCH RBC QN AUTO: 27.9 PG (ref 27–31)
MCHC RBC AUTO-ENTMCNC: 32 G/DL (ref 32–36)
MCHC RBC AUTO-ENTMCNC: 32.5 G/DL (ref 32–36)
MCV RBC AUTO: 85 FL (ref 82–98)
MCV RBC AUTO: 86 FL (ref 82–98)
MONOCYTES # BLD AUTO: 0.4 K/UL (ref 0.3–1)
MONOCYTES # BLD AUTO: 0.4 K/UL (ref 0.3–1)
MONOCYTES NFR BLD: 8.4 % (ref 4–15)
MONOCYTES NFR BLD: 8.7 % (ref 4–15)
NEUTROPHILS # BLD AUTO: 2 K/UL (ref 1.8–7.7)
NEUTROPHILS # BLD AUTO: 2.1 K/UL (ref 1.8–7.7)
NEUTROPHILS NFR BLD: 41.8 % (ref 38–73)
NEUTROPHILS NFR BLD: 45.3 % (ref 38–73)
NRBC BLD-RTO: 0 /100 WBC
NRBC BLD-RTO: 0 /100 WBC
PLATELET # BLD AUTO: 160 K/UL (ref 150–450)
PLATELET # BLD AUTO: 185 K/UL (ref 150–450)
PMV BLD AUTO: 10.9 FL (ref 9.2–12.9)
PMV BLD AUTO: 11.4 FL (ref 9.2–12.9)
RBC # BLD AUTO: 3.44 M/UL (ref 4–5.4)
RBC # BLD AUTO: 3.64 M/UL (ref 4–5.4)
WBC # BLD AUTO: 4.67 K/UL (ref 3.9–12.7)
WBC # BLD AUTO: 4.69 K/UL (ref 3.9–12.7)

## 2022-06-28 PROCEDURE — 25000003 PHARM REV CODE 250: Performed by: HOSPITALIST

## 2022-06-28 PROCEDURE — 25000003 PHARM REV CODE 250: Performed by: NURSE PRACTITIONER

## 2022-06-28 PROCEDURE — 45378 DIAGNOSTIC COLONOSCOPY: CPT | Mod: ,,, | Performed by: INTERNAL MEDICINE

## 2022-06-28 PROCEDURE — 85025 COMPLETE CBC W/AUTO DIFF WBC: CPT | Performed by: STUDENT IN AN ORGANIZED HEALTH CARE EDUCATION/TRAINING PROGRAM

## 2022-06-28 PROCEDURE — 37000009 HC ANESTHESIA EA ADD 15 MINS: Performed by: INTERNAL MEDICINE

## 2022-06-28 PROCEDURE — 25000003 PHARM REV CODE 250: Performed by: NURSE ANESTHETIST, CERTIFIED REGISTERED

## 2022-06-28 PROCEDURE — 96376 TX/PRO/DX INJ SAME DRUG ADON: CPT | Performed by: EMERGENCY MEDICINE

## 2022-06-28 PROCEDURE — 85014 HEMATOCRIT: CPT | Mod: 59 | Performed by: STUDENT IN AN ORGANIZED HEALTH CARE EDUCATION/TRAINING PROGRAM

## 2022-06-28 PROCEDURE — D9220A PRA ANESTHESIA: Mod: CRNA,,, | Performed by: NURSE ANESTHETIST, CERTIFIED REGISTERED

## 2022-06-28 PROCEDURE — 25000003 PHARM REV CODE 250: Performed by: STUDENT IN AN ORGANIZED HEALTH CARE EDUCATION/TRAINING PROGRAM

## 2022-06-28 PROCEDURE — 63600175 PHARM REV CODE 636 W HCPCS: Performed by: NURSE ANESTHETIST, CERTIFIED REGISTERED

## 2022-06-28 PROCEDURE — 85018 HEMOGLOBIN: CPT | Performed by: STUDENT IN AN ORGANIZED HEALTH CARE EDUCATION/TRAINING PROGRAM

## 2022-06-28 PROCEDURE — D9220A PRA ANESTHESIA: Mod: ANES,,, | Performed by: ANESTHESIOLOGY

## 2022-06-28 PROCEDURE — G0378 HOSPITAL OBSERVATION PER HR: HCPCS

## 2022-06-28 PROCEDURE — 63600175 PHARM REV CODE 636 W HCPCS: Performed by: STUDENT IN AN ORGANIZED HEALTH CARE EDUCATION/TRAINING PROGRAM

## 2022-06-28 PROCEDURE — 37000008 HC ANESTHESIA 1ST 15 MINUTES: Performed by: INTERNAL MEDICINE

## 2022-06-28 PROCEDURE — 45378 DIAGNOSTIC COLONOSCOPY: CPT | Performed by: INTERNAL MEDICINE

## 2022-06-28 PROCEDURE — S4991 NICOTINE PATCH NONLEGEND: HCPCS | Performed by: NURSE PRACTITIONER

## 2022-06-28 PROCEDURE — 45378 PR COLONOSCOPY,DIAGNOSTIC: ICD-10-PCS | Mod: ,,, | Performed by: INTERNAL MEDICINE

## 2022-06-28 PROCEDURE — D9220A PRA ANESTHESIA: ICD-10-PCS | Mod: CRNA,,, | Performed by: NURSE ANESTHETIST, CERTIFIED REGISTERED

## 2022-06-28 PROCEDURE — 36415 COLL VENOUS BLD VENIPUNCTURE: CPT | Performed by: STUDENT IN AN ORGANIZED HEALTH CARE EDUCATION/TRAINING PROGRAM

## 2022-06-28 PROCEDURE — D9220A PRA ANESTHESIA: ICD-10-PCS | Mod: ANES,,, | Performed by: ANESTHESIOLOGY

## 2022-06-28 RX ORDER — HYDRALAZINE HYDROCHLORIDE 20 MG/ML
10 INJECTION INTRAMUSCULAR; INTRAVENOUS EVERY 4 HOURS PRN
Status: DISCONTINUED | OUTPATIENT
Start: 2022-06-28 | End: 2022-06-28 | Stop reason: HOSPADM

## 2022-06-28 RX ORDER — LISINOPRIL 20 MG/1
40 TABLET ORAL DAILY
Start: 2022-06-28

## 2022-06-28 RX ORDER — ESMOLOL HYDROCHLORIDE 10 MG/ML
INJECTION INTRAVENOUS
Status: DISCONTINUED
Start: 2022-06-28 | End: 2022-06-28 | Stop reason: HOSPADM

## 2022-06-28 RX ORDER — PROPOFOL 10 MG/ML
INJECTION, EMULSION INTRAVENOUS
Status: DISCONTINUED
Start: 2022-06-28 | End: 2022-06-28 | Stop reason: HOSPADM

## 2022-06-28 RX ORDER — LISINOPRIL 20 MG/1
40 TABLET ORAL DAILY
Status: DISCONTINUED | OUTPATIENT
Start: 2022-06-28 | End: 2022-06-28 | Stop reason: HOSPADM

## 2022-06-28 RX ORDER — LIDOCAINE HYDROCHLORIDE 20 MG/ML
INJECTION INTRAVENOUS
Status: DISCONTINUED | OUTPATIENT
Start: 2022-06-28 | End: 2022-06-28

## 2022-06-28 RX ORDER — LIDOCAINE HYDROCHLORIDE 20 MG/ML
INJECTION, SOLUTION EPIDURAL; INFILTRATION; INTRACAUDAL; PERINEURAL
Status: DISCONTINUED
Start: 2022-06-28 | End: 2022-06-28 | Stop reason: HOSPADM

## 2022-06-28 RX ORDER — PROPOFOL 10 MG/ML
VIAL (ML) INTRAVENOUS
Status: DISCONTINUED | OUTPATIENT
Start: 2022-06-28 | End: 2022-06-28

## 2022-06-28 RX ADMIN — ESMOLOL HYDROCHLORIDE 10 MG: 10 INJECTION INTRAVENOUS at 11:06

## 2022-06-28 RX ADMIN — PROPOFOL 20 MG: 10 INJECTION, EMULSION INTRAVENOUS at 11:06

## 2022-06-28 RX ADMIN — LIDOCAINE HYDROCHLORIDE 60 MG: 20 INJECTION, SOLUTION INTRAVENOUS at 11:06

## 2022-06-28 RX ADMIN — PROPOFOL 40 MG: 10 INJECTION, EMULSION INTRAVENOUS at 11:06

## 2022-06-28 RX ADMIN — PROPOFOL 30 MG: 10 INJECTION, EMULSION INTRAVENOUS at 11:06

## 2022-06-28 RX ADMIN — HYDRALAZINE HYDROCHLORIDE 10 MG: 20 INJECTION, SOLUTION INTRAMUSCULAR; INTRAVENOUS at 10:06

## 2022-06-28 RX ADMIN — THERA TABS 1 TABLET: TAB at 09:06

## 2022-06-28 RX ADMIN — PROPOFOL 100 MG: 10 INJECTION, EMULSION INTRAVENOUS at 11:06

## 2022-06-28 RX ADMIN — PROPOFOL 20 MG: 10 INJECTION, EMULSION INTRAVENOUS at 12:06

## 2022-06-28 RX ADMIN — ATORVASTATIN CALCIUM 10 MG: 10 TABLET, FILM COATED ORAL at 09:06

## 2022-06-28 RX ADMIN — LISINOPRIL 40 MG: 20 TABLET ORAL at 09:06

## 2022-06-28 RX ADMIN — SODIUM CHLORIDE: 0.9 INJECTION, SOLUTION INTRAVENOUS at 11:06

## 2022-06-28 RX ADMIN — THIAMINE HCL TAB 100 MG 100 MG: 100 TAB at 09:06

## 2022-06-28 RX ADMIN — FOLIC ACID 1 MG: 1 TABLET ORAL at 09:06

## 2022-06-28 RX ADMIN — NICOTINE 1 PATCH: 7 PATCH, EXTENDED RELEASE TRANSDERMAL at 09:06

## 2022-06-28 NOTE — PLAN OF CARE
06/28/22 1508   Final Note   Assessment Type Final Discharge Note   Anticipated Discharge Disposition Home   Hospital Resources/Appts/Education Provided Appointments scheduled and added to AVS   Post-Acute Status   Post-Acute Authorization Other   Other Status No Post-Acute Service Needs   Pts nurse Britni notified that the pt can d/c from CM standpoint

## 2022-06-28 NOTE — DISCHARGE SUMMARY
[facility] Medicine  Discharge Summary      Patient Name: Kenya Washington  MRN: [id number]  Patient Class: OP- Observation  Admission Date: 6/27/2022  Hospital Length of Stay: 0 days  Discharge Date and Time: 06/28/2022 at 3:53pm  Attending Physician: Paris Guadarrama DO   Discharging Provider: Paris Guadarrama DO  Primary Care Provider: Primary Doctor No      HPI:   Ms. Washington is a 66 year old woman with hypertension, hyperlipidemia, tobacco abuse, lumbago and arthritis of her left knee who presented with chief complaint of hematuria.  In the ER, she actually had a large dark and bloody bowel movement and passed out. She states she has some mild diffuse abdominal discomfort worse on the left lower quadrant.  She described it as a squeezing and burning sensation rated 4/10 in intensity.  She denies taking NSAIDs and goodies powder but notes she does take something for her arthritis.  Review of outside records from PCP office noted the following medications:  naproxen, meloxicam, norco, zanaflex and robaxin.  She is unsure which if any of these she is taking.  She denies fevers, chills, palpitations, chest pain, shortness of breath, headache, cough, nausea, vomiting and diarrhea.      Procedure(s) (LRB):  COLONOSCOPY (N/A)      Hospital Course:   66 year old woman with hypertension, hyperlipidemia, tobacco abuse, lumbago and arthritis of her left knee admitted to observation for syncope due to GI bleeding, acute blood loss anemia, alcohol intoxication.  Monitoring h/h and treating with PPI infusion.  CXR with no acute process. CTA acute abdomen with no active gastrointestinal hemorrhage. There was Colonic diverticulosis and questionable mild acute diverticulitis. GI consulted and took for EGD  on 06/27 which showed a hiatal hernia and pyloric erythema.  Patient completed colonoscopy on 06/28, which showed Diverticulosis in the entire examined colon. No active bleeding noted. Hemoglobin remained stable,  she did not required blood transfusion.   Unclear how much she drinks but EtOH was 129 on admission- no signs of cirrhosis on exam or labs. No signs of withdrawals. She received MVI, folate and thiamine.      Patient admits she feels significantly better. No more syncope. Tolerated her diet without issues. Has had a bowel movement this morning and describes her stools as brown,  no melena or blood. Neice at bedside. Patient states her SBP runs in the 150s at home. Compliant with her home lisinopril 20mg, but does not check her blood pressure daily. Received Lisinopril 40mg while admitted with improvement in blood pressure. Pt denies any fever, headaches, vision changes, chest pain, shortness of breath, palpitations, abdominal pain, nausea, vomiting, or any new weaknesses. Patient's exam on discharge was as follow: Patient is alert and oriented, appears in no acute distress, heart with regular rate and rhythm, lungs clear to asculation with non-labored breathing, abdomen soft, and no new weaknesses or focal deficits seen. Bilateral lower extremities without any edema or calf tenderness.     Patient was counseled regarding any abnormal labs, differential diagnosis, treatment options, risk-benefit, lifestyle changes, prognosis, current condition, and medications. Patient was interactive and attentive.  Patient's questions were answered in a respectful and timely manner. Patient was instructed to follow-up with PCP within 1 week and to continue taking medications as prescribed.  Recommended to keep a blood pressure log to present to her PCP at follow up. Stated that she does not need another prescription for lisinopril as she just filled them. Instructed to take Lisinopril 40mg daily, with hold parameters. Also, extensively discussed the risks, benefits, and side effects of patient's medications. Discussed with patient about any medication changes. Patient verbalized understanding and agrees to treatment plan.  Patient  is stable for discharge.  Patient has no other questions or concerns at this time.  ED precautions discussed with the patient.     Vital signs are stable. Ambulating without any difficulty. Tolerating p.o. intake without any nausea or vomiting. Afebrile for over 24 hours. Patient is in stable condition and has no questions or concerns. Patient will be discharge to home once transportation secured. CM/SW to assist with discharge planning.         Goals of Care Treatment Preferences:  Code Status: Full Code      Consults:   Consults (From admission, onward)        Status Ordering Provider     Inpatient consult to Gastroenterology  Once        Provider:  Joshua Nunez MD    Completed MARGARITA SCHROEDER          No new Assessment & Plan notes have been filed under this hospital service since the last note was generated.  Service: Hospital Medicine    Final Active Diagnoses:    Diagnosis Date Noted POA    PRINCIPAL PROBLEM:  Acute GI bleeding [K92.2] 06/27/2022 Yes    Syncope [R55] 06/27/2022 Yes    Essential hypertension [I10] 06/27/2022 Yes    HLD (hyperlipidemia) [E78.5] 06/27/2022 Yes    Tobacco abuse [Z72.0] 06/27/2022 Yes    Lumbago [M54.50] 06/27/2022 Yes    Alcoholic intoxication without complication [F10.920] 06/27/2022 Yes      Problems Resolved During this Admission:       Discharged Condition: stable    Disposition: Home or Self Care    Follow Up:   Follow-up Information     Rangel Newberry MD Follow up on 7/7/2022.    Specialty: Internal Medicine  Why: call to schedule followup appointment post hospital discharge with PCP  Contact information:  [address] [place] [address]  [id number]  [place] [address]  [phone]                       Patient Instructions:      Diet Cardiac     Notify your health care provider if you experience any of the following:  persistent nausea and vomiting or diarrhea     Notify your health care provider if you experience any of the following:  difficulty breathing or  increased cough     Notify your health care provider if you experience any of the following:  persistent dizziness, light-headedness, or visual disturbances     Notify your health care provider if you experience any of the following:  increased confusion or weakness     Activity as tolerated       Significant Diagnostic Studies:  Imaging Results          CTA Acute GI [place], Abdomen and Pelvis (Final result)  Result time 06/27/22 03:54:03    Final result by Waldemar Dominguez MD (06/27/22 03:54:03)                 Impression:      1.  No definite CTA evidence of active gastrointestinal hemorrhage.    2.  Colonic diverticulosis.  There is a short segment of sigmoid colon with questionable mild wall thickening and adjacent stranding in a region of diverticula which could relate to mild acute diverticulitis.  No evidence of free intraperitoneal air or abscess.    3.  Mild gastric wall thickening involving the gastric fundus and body.  While this may relate to nondistention, there nonspecific gastritis is not excluded.  Further evaluation and follow-up with endoscopy as warranted.    4.  Nonobstructing right renal calculi.    5.  Moderate aortic atherosclerosis.    6.  Additional findings as above.      Electronically signed by: Waldemar Dominguez MD  Date:    06/27/2022  Time:    03:54             Narrative:    EXAMINATION:  CTA acute GI bleed, abdomen and pelvis    CLINICAL HISTORY:  Hematuria; lower GI bleed    TECHNIQUE:  Axial images were acquired of the abdomen and pelvis before and after the administration of 95 cc Omnipaque 350 IV contrast per CTA GI bleed protocol.  Sagittal and coronal reformatted images were reviewed.    COMPARISON:  None    FINDINGS:  The lung bases are unremarkable.  There is no pleural fluid present.  The visualized portions of the heart appear normal.    The liver is not significantly enlarged.  There is mild diffuse diminished attenuation of the hepatic parenchyma which may relate to hepatic  steatosis.  There are few small calcification within the right hepatic lobe.  The portal vein is patent.  The gallbladder shows no evidence of stones or pericholecystic fluid.  There is no intra-or extrahepatic biliary ductal dilatation.    There is a small hiatal hernia.  There is possible mild gastric wall thickening involving the gastric fundus and body which may relate to nondistention, although a nonspecific gastritis is not excluded.  The spleen and adrenal glands appear within normal limits.  No abnormal peripancreatic inflammatory change appreciated.    The kidneys are normal in size and location and concentrate and excrete contrast properly on delayed imaging.  There is no hydronephrosis.  There are nonobstructing right renal calculi.  Urinary bladder is mildly distended and otherwise unremarkable.  Uterus appears to be surgically absent.  No significant free fluid in the pelvis.    The abdominal aorta is normal in course and caliber with moderate atherosclerotic calcification along its course.  The major branch vessels appear grossly patent with associated atherosclerosis.  There is no retroperitoneal hematoma.    The visualized loops of small and large bowel demonstrate no evidence of obstruction.  No definite intraluminal extravasation of IV contrast material appreciated.  There is colonic diverticulosis.  There is a short segment of sigmoid colon with questionable mild wall thickening and adjacent inflammatory change in a region of diverticula which could relate to mild acute diverticulitis.  No evidence of free intraperitoneal air or portal venous gas.  There is no ascites.  The appendix appears within normal limits.  There is no bulky lymphadenopathy identified.    Osseous structures demonstrate advanced degenerative changes at L5-S1.  Mild degenerative changes are noted of the bilateral hips.  There is approximately 4.2 cm intramuscular lipoma noted within the anterior compartment of the proximal  left thigh.  The remaining visualized extraperitoneal soft tissues are unremarkable.                               X-Ray Chest 1 View (Final result)  Result time 06/27/22 02:03:05    Final result by Adrienne Varner MD (06/27/22 02:03:05)                 Impression:      No acute intrathoracic      Electronically signed by: Adrienne Varner  Date:    06/27/2022  Time:    02:03             Narrative:    EXAMINATION:  CHEST ONE VIEW    CLINICAL HISTORY:  Syncope and collapse    TECHNIQUE:  One view of the chest.    COMPARISON:  3/1/2020    FINDINGS:  The cardiac silhouette is stable.  Vascular calcification is seen at the aortic knob.  There is no focal consolidation, pneumothorax, or pleural effusion.  The heads of the humeri are again sclerotic.                                  Pending Diagnostic Studies:     None         Medications:  Reconciled Home Medications:      Medication List      CHANGE how you take these medications    lisinopriL 20 MG tablet  Commonly known as: PRINIVIL,ZESTRIL  Take 2 tablets (40 mg total) by mouth once daily.  What changed: how much to take        CONTINUE taking these medications    atorvastatin 10 MG tablet  Commonly known as: LIPITOR  Take 10 mg by mouth once daily.     ofloxacin 0.3 % ophthalmic solution  Commonly known as: OCUFLOX  Place 1 drop into the left eye 4 (four) times daily.     FARA-C ORAL  Take 1 tablet by mouth once daily.            Indwelling Lines/Drains at time of discharge:   Lines/Drains/Airways     None                 Time spent on the discharge of patient: Greater than 35 minutes         Paris Guadarrama DO  Department of Hospital Medicine  [facility] - Telemetry

## 2022-06-28 NOTE — ANESTHESIA PREPROCEDURE EVALUATION
06/28/2022    Pre-operative evaluation for Procedure(s) (LRB):  EGD (ESOPHAGOGASTRODUODENOSCOPY) (N/A)    Kenya Washington is a 66 y.o. female     Patient Active Problem List   Diagnosis    Acute GI bleeding    Syncope    Essential hypertension    HLD (hyperlipidemia)    Tobacco abuse    Lumbago    Alcoholic intoxication without complication       Review of patient's allergies indicates:  No Known Allergies    Current Facility-Administered Medications on File Prior to Visit   Medication Dose Route Frequency Provider Last Rate Last Admin    0.9%  NaCl infusion   Intravenous Continuous Manicia OSCAR Bowlesl, NP 75 mL/hr at 06/27/22 2257 New Bag at 06/27/22 2257    acetaminophen tablet 650 mg  650 mg Oral Q6H PRN Dillon Newsome MD        atorvastatin tablet 10 mg  10 mg Oral Daily Dillon Newsome MD   10 mg at 06/28/22 0907    esmoloL (BREVIBLOC) 100 mg/10 mL (10 mg/mL) injection             folic acid tablet 1 mg  1 mg Oral Daily Alexandericia SAmando Chowdary, NP   1 mg at 06/28/22 0907    hydrALAZINE injection 10 mg  10 mg Intravenous Q4H PRN [facility] T. Guadarrama, DO   10 mg at 06/28/22 1051    LIDOcaine (PF) 20 mg/mL (2%) 20 mg/mL (2 %) injection             lisinopriL tablet 40 mg  40 mg Oral Daily [facility]- T. Guadarrama, DO   40 mg at 06/28/22 0935    loperamide capsule 2 mg  2 mg Oral QID PRN Dillon Newsome MD        lorazepam injection 2 mg  2 mg Intravenous Q2H PRN Adry Chowdary, NP        melatonin tablet 6 mg  6 mg Oral Nightly PRN Dillon Newsome MD        multivitamin tablet  1 tablet Oral Daily Alexandericia OSCAR Chowdary, NP   1 tablet at 06/28/22 0907    nicotine 7 mg/24 hr 1 patch  1 patch Transdermal Daily Manicia SAmando Chowdary, NP   1 patch at 06/28/22 0908    ondansetron injection 4 mg  4 mg Intravenous Q4H PRN Dillon Newsome MD        propofoL (DIPRIVAN) 10 mg/mL infusion             thiamine tablet  100 mg  100 mg Oral Daily Adry Chowdary, NP   100 mg at 06/28/22 0907     Current Outpatient Medications on File Prior to Visit   Medication Sig Dispense Refill    ascorbic acid (FARA-C ORAL) Take 1 tablet by mouth once daily.      atorvastatin (LIPITOR) 10 MG tablet Take 10 mg by mouth once daily.      lisinopriL (PRINIVIL,ZESTRIL) 20 MG tablet Take 20 mg by mouth once daily.      ofloxacin (OCUFLOX) 0.3 % ophthalmic solution Place 1 drop into the left eye 4 (four) times daily.         Past Surgical History:   Procedure Laterality Date    ESOPHAGOGASTRODUODENOSCOPY N/A 6/27/2022    Procedure: EGD (ESOPHAGOGASTRODUODENOSCOPY);  Surgeon: Dorie Frazier MD;  Location: [facility];  Service: Endoscopy;  Laterality: N/A;    HYSTERECTOMY       VITALS  There were no vitals filed for this visit.    LABS  CBC:   Recent Labs     06/27/22  2344 06/28/22  0639   WBC 4.69 4.67   RBC 3.44* 3.64*   HGB 9.6* 9.9*   HCT 29.5* 30.9*    185   MCV 86 85   MCH 27.9 27.2   MCHC 32.5 32.0       CMP:   Recent Labs     06/27/22  0225      K 3.6      CO2 18*   BUN 11   CREATININE 0.7      MG 1.6   CALCIUM 8.6*   ALBUMIN 3.3*   PROT 7.1   ALKPHOS 78   ALT 14   AST 15   BILITOT 0.3       INR  Recent Labs     06/27/22  0132   INR 1.0   APTT 22.2     Pre-op Assessment    I have reviewed the Patient Summary Reports.    I have reviewed the NPO Status.   I have reviewed the Medications.     Review of Systems  Anesthesia Hx:  No problems with previous Anesthesia  Denies Family Hx of Anesthesia complications.   Denies Personal Hx of Anesthesia complications.   Social:  Smoker    Hematology/Oncology:         -- Anemia:   EENT/Dental:EENT/Dental Normal   Cardiovascular:   Exercise tolerance: good Hypertension hyperlipidemia    Pulmonary:  Pulmonary Normal    Hepatic/GI:   Acute GI bleed  Melena  Pt denies hematemesis; denies vomiting   Neurological:  Neurology Normal  Denies CVA. Denies Seizures.     Endocrine:  Endocrine Normal    Psych:  Psychiatric Normal           Physical Exam  General: Well nourished, Alert, Cooperative and Oriented    Airway:  Mouth Opening: Normal  TM Distance: Normal  Neck ROM: Normal ROM    Dental:  Dentures    Chest/Lungs:  Normal Respiratory Rate    Heart:  Rate: Normal  Rhythm: Regular Rhythm  Sounds: Normal        Anesthesia Plan  Type of Anesthesia, risks & benefits discussed:    Anesthesia Type: Gen Natural Airway  Intra-op Monitoring Plan: Standard ASA Monitors  Induction:  IV  Informed Consent: Informed consent signed with the Patient and all parties understand the risks and agree with anesthesia plan.  All questions answered.   ASA Score: 2    Ready For Surgery From Anesthesia Perspective.     .

## 2022-06-28 NOTE — PLAN OF CARE
Patient alert, oriented; denies any pain nor any complaints; has her top denture; Recovery criteria met. Handoff given to ZHAO Lang. No family member in the waiting room.

## 2022-06-28 NOTE — TRANSFER OF CARE
"Anesthesia Transfer of Care Note    Patient: Kenya Washington    Procedure(s) Performed: Procedure(s) (LRB):  COLONOSCOPY (N/A)    Patient location: GI    Anesthesia Type: general    Transport from OR: Transported from OR on room air with adequate spontaneous ventilation    Post pain: adequate analgesia    Post assessment: no apparent anesthetic complications and tolerated procedure well    Post vital signs: stable    Level of consciousness: responds to stimulation and sedated    Nausea/Vomiting: no nausea/vomiting    Complications: none    Transfer of care protocol was followed      Last vitals:   Visit Vitals  /72 (BP Location: Right arm, Patient Position: Lying)   Pulse 88   Temp 36.8 °C (98.2 °F) (Oral)   Resp 14   Ht 5' 3" (1.6 m)   Wt 65.6 kg (144 lb 10 oz)   SpO2 100%   Breastfeeding No   BMI 25.62 kg/m²     "

## 2022-06-28 NOTE — PROVATION PATIENT INSTRUCTIONS
Discharge Summary/Instructions after an Endoscopic Procedure  Patient Name: Kenya Washington  Patient MRN: [id number]  Patient YOB: 1955  Tuesday, June 28, 2022  Waldemar Cabrera MD  Dear patient,  As a result of recent federal legislation (The Federal Cures Act), you may   receive lab or pathology results from your procedure in your [facility]   account before your physician is able to contact you. Your physician or   their representative will relay the results to you with their   recommendations at their soonest availability.  Thank you,  RESTRICTIONS:  During your procedure today, you received medications for sedation.  These   medications may affect your judgment, balance and coordination.  Therefore,   for 24 hours, you have the following restrictions:   - DO NOT drive a car, operate machinery, make legal/financial decisions,   sign important papers or drink alcohol.    ACTIVITY:  Today: no heavy lifting, straining or running due to procedural   sedation/anesthesia.  The following day: return to full activity including work.  DIET:  Eat and drink normally unless instructed otherwise.     TREATMENT FOR COMMON SIDE EFFECTS:  - Mild abdominal pain, nausea, belching, bloating or excessive gas:  rest,   eat lightly and use a heating pad.  - Sore Throat: treat with throat lozenges and/or gargle with warm salt   water.  - Because air was used during the procedure, expelling large amounts of air   from your rectum or belching is normal.  - If a bowel prep was taken, you may not have a bowel movement for 1-3 days.    This is normal.  SYMPTOMS TO WATCH FOR AND REPORT TO YOUR PHYSICIAN:  1. Abdominal pain or bloating, other than gas cramps.  2. Chest pain.  3. Back pain.  4. Signs of infection such as: chills or fever occurring within 24 hours   after the procedure.  5. Rectal bleeding, which would show as bright red, maroon, or black stools.   (A tablespoon of blood from the rectum is not serious, especially if    hemorrhoids are present.)  6. Vomiting.  7. Weakness or dizziness.  GO DIRECTLY TO THE NEAREST EMERGENCY ROOM IF YOU HAVE ANY OF THE FOLLOWING:      Difficulty breathing              Chills and/or fever over 101 F   Persistent vomiting and/or vomiting blood   Severe abdominal pain   Severe chest pain   Black, tarry stools   Bleeding- more than one tablespoon   Any other symptom or condition that you feel may need urgent attention  Your doctor recommends these additional instructions:  If any biopsies were taken, your doctors clinic will contact you in 1 to 2   weeks with any results.  - Return patient to hospital head for ongoing care.   - Resume previous diet.   - Continue present medications.   - Monitor clinical course to determine need for further management  - Repeat colonoscopy for surveillance based on clinical status at that   time.  For questions, problems or results please call your physician - Waldemar Cabrera MD at Work:  [phone].  [facility] can be reached at [phone]     IF A COMPLICATION OR EMERGENCY SITUATION ARISES AND YOU ARE UNABLE TO REACH   YOUR PHYSICIAN - GO DIRECTLY TO THE EMERGENCY ROOM.  Waldemar Cabrera MD  6/28/2022 12:11:02 PM  This report has been verified and signed electronically.  Dear patient,  As a result of recent federal legislation (The Federal Cures Act), you may   receive lab or pathology results from your procedure in your [facility]   account before your physician is able to contact you. Your physician or   their representative will relay the results to you with their   recommendations at their soonest availability.  Thank you,  PROVATION

## 2022-06-28 NOTE — ANESTHESIA POSTPROCEDURE EVALUATION
Anesthesia Post Evaluation    Patient: Kenya Washington    Procedure(s) Performed: Procedure(s) (LRB):  COLONOSCOPY (N/A)    Final Anesthesia Type: general      Patient location during evaluation: GI PACU  Patient participation: Yes- Able to Participate  Level of consciousness: awake and alert and oriented  Post-procedure vital signs: reviewed and stable  Pain management: adequate  Airway patency: patent    PONV status at discharge: No PONV  Anesthetic complications: no      Cardiovascular status: blood pressure returned to baseline and hemodynamically stable  Respiratory status: unassisted, spontaneous ventilation and room air  Hydration status: euvolemic  Follow-up not needed.          Vitals Value Taken Time   /80 06/28/22 1340   Temp 36.9 °C (98.4 °F) 06/28/22 1325   Pulse 67 06/28/22 1325   Resp 19 06/28/22 1325   SpO2 98 % 06/28/22 1325         Event Time   Out of Recovery 06/28/2022 12:50:00         Pain/Amirah Score: Amirah Score: 10 (6/28/2022 12:38 PM)

## 2022-08-10 NOTE — ED PROVIDER NOTES
Encounter Date: 6/26/2022       History     Chief Complaint   Patient presents with    Hematuria     Pt presents to ED c/o blood in urine started today.  Denies fever, chills, n/v. Abd/back pain or frequent urination. Pain 0/10.       65yo F smoker presents to ED with chief complaint hematuria.     No dysuria. No frequent UTIs. No urinary frequency. No flank pain. No abdominal pain. Denies hx hematuria, denies hx nephrolithiasis.    Upon evaluation, pt states she had a large BM in the ED triage area, some dark blood mixed in with stool. No hx GI bleed. During evaluation, pt began to c/o lightheadedness, syncopized during my evaluation. Pt moved to main ED for continued evaluation.     She denies CP, SOB. No HA. No cough. No recent illness. No hx syncopal episodes. No hx anemia. Pt states lightheadedness improved upon return of consciousness. No postictal phase. No decorticate or decerebrate posturing during event. No apnea. No loss of pulses. She denies abdominal pain. Denies nausea/vomiting. No diaphoresis. No other complaints. Denies any antiplatelet or anticoagulation. No ASA use. No NSAID use. Denies hx hematochezia or GI bleeding. No recent illness. No fever, chills, myalgias. No hx CVA. No arm or leg weakness. No facial droop. No unilateral weakness.     Son states increased stress recently.     She admits to ETOH use today.     States she has had colonoscopy in the past without any issues.  No colonoscopy on file.    PMH:  HTN  HLD    TTE 12/2/21:    CONCLUSIONS     Mild left ventricular hypertrophy.     Normal left ventricular systolic function.     Left ventricular ejection fraction is estimated at 70%.     Mildly increased left atrial size.     Mild aortic valve regurgitation.     Mild tricuspid valve regurgitation.     Mildly dilated  ascending aorta measuring 37 mm.         Review of patient's allergies indicates:  No Known Allergies  Past Medical History:   Diagnosis Date    High cholesterol      Hypertension      Past Surgical History:   Procedure Laterality Date    HYSTERECTOMY       No family history on file.  Social History     Tobacco Use    Smoking status: Current Every Day Smoker     Packs/day: 0.50     Years: 20.00     Pack years: 10.00     Types: Cigarettes    Smokeless tobacco: Never Used   Substance Use Topics    Alcohol use: Yes     Comment: occaisionally    Drug use: Never     Review of Systems   Constitutional: Negative for appetite change, chills and fever.   Respiratory: Negative for shortness of breath.    Cardiovascular: Negative for chest pain and palpitations.   Gastrointestinal: Positive for blood in stool. Negative for abdominal pain, nausea and vomiting.   Genitourinary: Positive for hematuria. Negative for flank pain and vaginal bleeding.   Musculoskeletal: Negative for back pain, myalgias, neck pain and neck stiffness.   Neurological: Positive for syncope and light-headedness.       Physical Exam     Initial Vitals [06/26/22 2343]   BP Pulse Resp Temp SpO2   (!) 145/83 88 18 98.1 °F (36.7 °C) 97 %      MAP       --         Physical Exam    Nursing note and vitals reviewed.  Constitutional: She appears well-developed and well-nourished. She is not diaphoretic. No distress.   Overall well-appearing, nontoxic. Does appear a bit drowsy.    HENT:   Head: Normocephalic and atraumatic.   Neck: Neck supple.   Normal range of motion.  Cardiovascular: Intact distal pulses.   NSR. No pretibial edema. No unilateral leg swelling or calf ttp.    Pulmonary/Chest: Breath sounds normal. No respiratory distress.   Abdominal: Abdomen is soft. Bowel sounds are normal. She exhibits no distension. There is no abdominal tenderness.   Genitourinary:    Genitourinary Comments: FLACO: dark red blood from distal rectum. No significant pain or tenderness during exam. No anal fissure. No palpable internal mass.      Musculoskeletal:         General: No tenderness. Normal range of motion.      Cervical back:  Normal range of motion and neck supple.     Neurological: She is alert and oriented to person, place, and time. She has normal strength. GCS score is 15. GCS eye subscore is 4. GCS verbal subscore is 5. GCS motor subscore is 6.   Skin: Skin is warm. Capillary refill takes less than 2 seconds.   Psychiatric: She has a normal mood and affect. Thought content normal.         ED Course   Critical Care    Date/Time: 6/27/2022 2:17 AM  Performed by: Joshua Rockwell PA-C  Authorized by: Viry Vallecillo MD   Direct patient critical care time: 20 minutes  Additional history critical care time: 10 minutes  Ordering / reviewing critical care time: 10 minutes  Documentation critical care time: 10 minutes  Consulting other physicians critical care time: 5 minutes  Consult with family critical care time: 10 minutes  Total critical care time (exclusive of procedural time) : 65 minutes  Critical care was necessary to treat or prevent imminent or life-threatening deterioration of the following conditions: circulatory failure, respiratory failure and cardiac failure.  Critical care was time spent personally by me on the following activities: discussions with consultants, development of treatment plan with patient or surrogate, evaluation of patient's response to treatment, interpretation of cardiac output measurements, examination of patient, obtaining history from patient or surrogate, ordering and performing treatments and interventions, ordering and review of laboratory studies, ordering and review of radiographic studies, review of old charts and re-evaluation of patient's condition.        Labs Reviewed   URINALYSIS, REFLEX TO URINE CULTURE - Abnormal; Notable for the following components:       Result Value    Color, UA Colorless (*)     Specific Gravity, UA <1.005 (*)     Leukocytes, UA Trace (*)     All other components within normal limits    Narrative:     Specimen Source->Urine   CBC W/ AUTO DIFFERENTIAL - Abnormal;  Notable for the following components:    Hemoglobin 11.3 (*)     Hematocrit 35.3 (*)     All other components within normal limits   DRUG SCREEN PANEL, URINE EMERGENCY - Abnormal; Notable for the following components:    Creatinine, Urine 12.7 (*)     All other components within normal limits    Narrative:     Specimen Source->Urine   COMPREHENSIVE METABOLIC PANEL - Abnormal; Notable for the following components:    CO2 18 (*)     Calcium 8.6 (*)     Albumin 3.3 (*)     All other components within normal limits   ALCOHOL,MEDICAL (ETHANOL) - Abnormal; Notable for the following components:    Alcohol, Serum 129 (*)     All other components within normal limits   CBC W/ AUTO DIFFERENTIAL - Abnormal; Notable for the following components:    RBC 3.53 (*)     Hemoglobin 9.9 (*)     Hematocrit 30.5 (*)     All other components within normal limits    Narrative:     REPEAT   POCT GLUCOSE - Abnormal; Notable for the following components:    POCT Glucose 120 (*)     All other components within normal limits   SARS-COV-2 RDRP GENE - Normal   URINALYSIS MICROSCOPIC    Narrative:     Specimen Source->Urine   PROTIME-INR   APTT   ALCOHOL,MEDICAL (ETHANOL)   TSH   TROPONIN I   MAGNESIUM   CBC W/ AUTO DIFFERENTIAL   TYPE & SCREEN     EKG Readings: (Independently Interpreted)   Normal sinus rhythm, ventricular rate 73 beats per minute.  Normal CT, normal QT.  No right axis deviation.  No ST elevation.  Mild artifact.  Inverted T-wave V1.  Nonspecific ST segment changes to the V3 through V4, new from previous.  EKG is overall grossly similar previous dated 03/01/2020.        Imaging Results          CTA Acute GI [place], Abdomen and Pelvis (Final result)  Result time 06/27/22 03:54:03    Final result by Waldemar Dominguez MD (06/27/22 03:54:03)                 Impression:      1.  No definite CTA evidence of active gastrointestinal hemorrhage.    2.  Colonic diverticulosis.  There is a short segment of sigmoid colon with questionable  mild wall thickening and adjacent stranding in a region of diverticula which could relate to mild acute diverticulitis.  No evidence of free intraperitoneal air or abscess.    3.  Mild gastric wall thickening involving the gastric fundus and body.  While this may relate to nondistention, there nonspecific gastritis is not excluded.  Further evaluation and follow-up with endoscopy as warranted.    4.  Nonobstructing right renal calculi.    5.  Moderate aortic atherosclerosis.    6.  Additional findings as above.      Electronically signed by: Waldemar Dominguez MD  Date:    06/27/2022  Time:    03:54             Narrative:    EXAMINATION:  CTA acute GI bleed, abdomen and pelvis    CLINICAL HISTORY:  Hematuria; lower GI bleed    TECHNIQUE:  Axial images were acquired of the abdomen and pelvis before and after the administration of 95 cc Omnipaque 350 IV contrast per CTA GI bleed protocol.  Sagittal and coronal reformatted images were reviewed.    COMPARISON:  None    FINDINGS:  The lung bases are unremarkable.  There is no pleural fluid present.  The visualized portions of the heart appear normal.    The liver is not significantly enlarged.  There is mild diffuse diminished attenuation of the hepatic parenchyma which may relate to hepatic steatosis.  There are few small calcification within the right hepatic lobe.  The portal vein is patent.  The gallbladder shows no evidence of stones or pericholecystic fluid.  There is no intra-or extrahepatic biliary ductal dilatation.    There is a small hiatal hernia.  There is possible mild gastric wall thickening involving the gastric fundus and body which may relate to nondistention, although a nonspecific gastritis is not excluded.  The spleen and adrenal glands appear within normal limits.  No abnormal peripancreatic inflammatory change appreciated.    The kidneys are normal in size and location and concentrate and excrete contrast properly on delayed imaging.  There is no  hydronephrosis.  There are nonobstructing right renal calculi.  Urinary bladder is mildly distended and otherwise unremarkable.  Uterus appears to be surgically absent.  No significant free fluid in the pelvis.    The abdominal aorta is normal in course and caliber with moderate atherosclerotic calcification along its course.  The major branch vessels appear grossly patent with associated atherosclerosis.  There is no retroperitoneal hematoma.    The visualized loops of small and large bowel demonstrate no evidence of obstruction.  No definite intraluminal extravasation of IV contrast material appreciated.  There is colonic diverticulosis.  There is a short segment of sigmoid colon with questionable mild wall thickening and adjacent inflammatory change in a region of diverticula which could relate to mild acute diverticulitis.  No evidence of free intraperitoneal air or portal venous gas.  There is no ascites.  The appendix appears within normal limits.  There is no bulky lymphadenopathy identified.    Osseous structures demonstrate advanced degenerative changes at L5-S1.  Mild degenerative changes are noted of the bilateral hips.  There is approximately 4.2 cm intramuscular lipoma noted within the anterior compartment of the proximal left thigh.  The remaining visualized extraperitoneal soft tissues are unremarkable.                               X-Ray Chest 1 View (Final result)  Result time 06/27/22 02:03:05    Final result by Adrienne Varner MD (06/27/22 02:03:05)                 Impression:      No acute intrathoracic      Electronically signed by: Adrienne Vraner  Date:    06/27/2022  Time:    02:03             Narrative:    EXAMINATION:  CHEST ONE VIEW    CLINICAL HISTORY:  Syncope and collapse    TECHNIQUE:  One view of the chest.    COMPARISON:  3/1/2020    FINDINGS:  The cardiac silhouette is stable.  Vascular calcification is seen at the aortic knob.  There is no focal consolidation, pneumothorax, or  pleural effusion.  The heads of the humeri are again sclerotic.                                 Medications   pantoprazole 40 mg in  mL infusion (ready to mix system) (has no administration in time range)   pantoprazole injection 80 mg (80 mg Intravenous Given 6/27/22 0158)   lactated ringers bolus 1,000 mL (0 mLs Intravenous Stopped 6/27/22 3709)   iohexoL (OMNIPAQUE 350) injection 95 mL (95 mLs Intravenous Given 6/27/22 1483)                Attending Attestation:   Physician Attestation Statement for Resident:  As the supervising MD   Physician Attestation Statement: I have personally seen and examined this patient.   I agree with the above history. -: 66-year-old female with history of hypertension presented to the emergency department with complaint of hematuria, while waiting in the lobby, patient had a bowel movement with blood.  Shortly thereafter, she had a witnessed syncopal episode and was brought back to main ER.  On my assessment, the patient reports mild nausea, she denies any abdominal pain, she denies use of blood thinners.  She confirms she has had a colonoscopy in the past and does not recall any acute issues with that.  The patient reports that she was drinking alcohol today.   As the supervising MD I agree with the above PE.   -: Patient is alert, no distress noted, blood pressure 145/83, afebrile.  Heart with regular rate and rhythm, no respiratory distress.  Abdomen is soft, slightly distended, nontender.  Rectal exam performed by the midlevel provider showed hematochezia.   As the supervising MD I agree with the above treatment, course, plan, and disposition.   -: Patient has IV established, type and screen is pending, labs and process, CT a ordered.  Plan for GI consult and hospital admission.  Viry Vallecillo MD   2:46 AM                  ED Course as of 06/27/22 0459   Mon Jun 27, 2022   0247 Plan to admit given new GI bleed, syncopal episode.  [SM]   0444 H/H decreased from 11.3 to  9.9. gave 1L NS. Baseline hemoglobin around 13. Remains hemodynamically stable. Will admit for observation, have her evaluated by GI. No hx esophageal varices or hx previous GI bleeding. No hematemesis. On reevaluation, states no repeat episodes of hematochezia. No complaints. Will admit to  observation service. [SM]      ED Course User Index  [SM] Joshua Rockwell PA-C             Clinical Impression:   Final diagnoses:  [R55] Syncope  [K92.2] Acute GI bleeding (Primary)          ED Disposition Condition    Observation               Joshua Rockwell PA-C  06/27/22 0086     Libtayo Counseling- I discussed with the patient the risks of Libtayo including but not limited to nausea, vomiting, diarrhea, and bone or muscle pain.  The patient verbalized understanding of the proper use and possible adverse effects of Libtayo.  All of the patient's questions and concerns were addressed.